# Patient Record
Sex: FEMALE | Race: WHITE | NOT HISPANIC OR LATINO | Employment: UNEMPLOYED | URBAN - METROPOLITAN AREA
[De-identification: names, ages, dates, MRNs, and addresses within clinical notes are randomized per-mention and may not be internally consistent; named-entity substitution may affect disease eponyms.]

---

## 2017-01-11 ENCOUNTER — OFFICE VISIT (OUTPATIENT)
Dept: URGENT CARE | Facility: URGENT CARE | Age: 27
End: 2017-01-11
Payer: COMMERCIAL

## 2017-01-11 VITALS
SYSTOLIC BLOOD PRESSURE: 108 MMHG | DIASTOLIC BLOOD PRESSURE: 60 MMHG | WEIGHT: 160 LBS | TEMPERATURE: 97.9 F | OXYGEN SATURATION: 100 % | HEART RATE: 49 BPM | BODY MASS INDEX: 23.7 KG/M2 | HEIGHT: 69 IN

## 2017-01-11 DIAGNOSIS — R07.0 THROAT PAIN: Primary | ICD-10-CM

## 2017-01-11 LAB
DEPRECATED S PYO AG THROAT QL EIA: NORMAL
MICRO REPORT STATUS: NORMAL
SPECIMEN SOURCE: NORMAL

## 2017-01-11 PROCEDURE — 99213 OFFICE O/P EST LOW 20 MIN: CPT | Performed by: FAMILY MEDICINE

## 2017-01-11 PROCEDURE — 87880 STREP A ASSAY W/OPTIC: CPT | Performed by: FAMILY MEDICINE

## 2017-01-11 PROCEDURE — 87081 CULTURE SCREEN ONLY: CPT | Performed by: FAMILY MEDICINE

## 2017-01-11 NOTE — NURSING NOTE
"Chief Complaint   Patient presents with     Pharyngitis     sore thoat, fatigue, no appetite 4x days       Initial /60 mmHg  Pulse 49  Temp(Src) 97.9  F (36.6  C) (Oral)  Ht 5' 9\" (1.753 m)  Wt 160 lb (72.576 kg)  BMI 23.62 kg/m2  SpO2 100% Estimated body mass index is 23.62 kg/(m^2) as calculated from the following:    Height as of this encounter: 5' 9\" (1.753 m).    Weight as of this encounter: 160 lb (72.576 kg).  BP completed using cuff size: regular    "

## 2017-01-11 NOTE — MR AVS SNAPSHOT
"              After Visit Summary   2017    Nicolle Devlin    MRN: [id number]           Patient Information     Date Of Birth          1990        Visit Information        Provider Department      2017 1:15 PM Vishnu Vasquez, DO [facility]        Today's Diagnoses     Throat pain    -  1        Follow-ups after your visit        Who to contact     If you have questions or need follow up information about today's clinic visit or your schedule please contact [place] [facility] directly at [phone].  Normal or non-critical lab and imaging results will be communicated to you by Lumos Labshart, letter or phone within 4 business days after the clinic has received the results. If you do not hear from us within 7 days, please contact the clinic through Lumos Labshart or phone. If you have a critical or abnormal lab result, we will notify you by phone as soon as possible.  Submit refill requests through [employer] or call your pharmacy and they will forward the refill request to us. Please allow 3 business days for your refill to be completed.          Additional Information About Your Visit        MyChart Information     [employer] lets you send messages to your doctor, view your test results, renew your prescriptions, schedule appointments and more. To sign up, go to [email or web address] . Click on \"Log in\" on the left side of the screen, which will take you to the Welcome page. Then click on \"Sign up Now\" on the right side of the page.     You will be asked to enter the access code listed below, as well as some personal information. Please follow the directions to create your username and password.     Your access code is: [email or web address]  Expires: 2017  1:50 PM     Your access code will  in 90 days. If you need help or a new code, please call your [place] clinic or [phone].        Care EveryWhere ID     This is your Care EveryWhere ID. This could be " "used by other organizations to access your [place] medical records  [id number]        Your Vitals Were     Pulse Temperature Height BMI (Body Mass Index) Pulse Oximetry       49 97.9  F (36.6  C) (Oral) 5' 9\" (1.753 m) 23.62 kg/m2 100%        Blood Pressure from Last 3 Encounters:   01/11/17 108/60   11/08/16 110/60   10/23/15 114/70    Weight from Last 3 Encounters:   01/11/17 160 lb (72.576 kg)   11/08/16 163 lb 9.6 oz (74.208 kg)   10/23/15 163 lb 12.8 oz (74.299 kg)              We Performed the Following     Beta strep group A culture     Rapid strep screen        Primary Care Provider    None Specified       No primary provider on file.        Thank you!     Thank you for choosing [facility]  for your care. Our goal is always to provide you with excellent care. Hearing back from our patients is one way we can continue to improve our services. Please take a few minutes to complete the written survey that you may receive in the mail after your visit with us. Thank you!             Your Updated Medication List - Protect others around you: Learn how to safely use, store and throw away your medicines at www.disposemymeds.org.          This list is accurate as of: 1/11/17  1:50 PM.  Always use your most recent med list.                   Brand Name Dispense Instructions for use    LORazepam 0.5 MG tablet    ATIVAN    15 tablet    Take 1 tablet (0.5 mg) by mouth every 8 hours as needed for anxiety       PARoxetine 20 MG tablet    PAXIL    90 tablet    Take 1 tablet (20 mg) by mouth daily         "

## 2017-01-11 NOTE — PROGRESS NOTES
"SUBJECTIVE: Nicolle Devlin is a 26 year old female presenting with a chief complaint of nasal congestion, cough  and sore throat.  Onset of symptoms was 4 day(s) ago.  Course of illness is same.    Severity moderate  Current and Associated symptoms: \"cold symptoms\"  Treatment measures tried include OTC meds.  Predisposing factors include None.    No past medical history on file.  No Known Allergies  Social History   Substance Use Topics     Smoking status: Never Smoker      Smokeless tobacco: Never Used     Alcohol Use: Yes      Comment: occasionally       ROS:  SKIN: no rash  GI: no vomiting    OBJECTIVE:  /60 mmHg  Pulse 49  Temp(Src) 97.9  F (36.6  C) (Oral)  Ht 5' 9\" (1.753 m)  Wt 160 lb (72.576 kg)  BMI 23.62 kg/m2  SpO2 100%GENERAL APPEARANCE: healthy, alert and no distress  EYES: EOMI,  PERRL, conjunctiva clear  HENT: ear canals and TM's normal.  Nose and mouth without ulcers, erythema or lesions  NECK: supple, nontender, no lymphadenopathy  RESP: lungs clear to auscultation - no rales, rhonchi or wheezes  SKIN: no suspicious lesions or rashes      ICD-10-CM    1. Throat pain R07.0 Rapid strep screen     Beta strep group A culture       Fluids/Rest, f/u if worse/not any better    "

## 2017-01-13 LAB
BACTERIA SPEC CULT: NORMAL
MICRO REPORT STATUS: NORMAL
SPECIMEN SOURCE: NORMAL

## 2017-06-27 ENCOUNTER — TRANSFERRED RECORDS (OUTPATIENT)
Dept: HEALTH INFORMATION MANAGEMENT | Facility: CLINIC | Age: 27
End: 2017-06-27

## 2017-06-27 LAB — PAP-ABSTRACT: NORMAL

## 2017-11-13 ENCOUNTER — OFFICE VISIT (OUTPATIENT)
Dept: FAMILY MEDICINE | Facility: CLINIC | Age: 27
End: 2017-11-13
Payer: COMMERCIAL

## 2017-11-13 VITALS
TEMPERATURE: 97.9 F | SYSTOLIC BLOOD PRESSURE: 122 MMHG | DIASTOLIC BLOOD PRESSURE: 78 MMHG | RESPIRATION RATE: 18 BRPM | HEART RATE: 56 BPM | BODY MASS INDEX: 23.78 KG/M2 | OXYGEN SATURATION: 100 % | WEIGHT: 161 LBS

## 2017-11-13 DIAGNOSIS — F43.23 ADJUSTMENT DISORDER WITH MIXED ANXIETY AND DEPRESSED MOOD: ICD-10-CM

## 2017-11-13 DIAGNOSIS — F41.0 PANIC ATTACK: ICD-10-CM

## 2017-11-13 PROCEDURE — 99213 OFFICE O/P EST LOW 20 MIN: CPT | Performed by: NURSE PRACTITIONER

## 2017-11-13 RX ORDER — PAROXETINE 20 MG/1
20 TABLET, FILM COATED ORAL DAILY
Qty: 90 TABLET | Refills: 3 | Status: SHIPPED | OUTPATIENT
Start: 2017-11-13 | End: 2018-11-08

## 2017-11-13 RX ORDER — LORAZEPAM 0.5 MG/1
0.5 TABLET ORAL EVERY 8 HOURS PRN
Qty: 15 TABLET | Refills: 0 | Status: SHIPPED | OUTPATIENT
Start: 2017-11-13 | End: 2018-11-30

## 2017-11-13 ASSESSMENT — ANXIETY QUESTIONNAIRES
GAD7 TOTAL SCORE: 1
7. FEELING AFRAID AS IF SOMETHING AWFUL MIGHT HAPPEN: NOT AT ALL
3. WORRYING TOO MUCH ABOUT DIFFERENT THINGS: NOT AT ALL
6. BECOMING EASILY ANNOYED OR IRRITABLE: NOT AT ALL
5. BEING SO RESTLESS THAT IT IS HARD TO SIT STILL: NOT AT ALL
2. NOT BEING ABLE TO STOP OR CONTROL WORRYING: NOT AT ALL
1. FEELING NERVOUS, ANXIOUS, OR ON EDGE: SEVERAL DAYS

## 2017-11-13 ASSESSMENT — PATIENT HEALTH QUESTIONNAIRE - PHQ9
SUM OF ALL RESPONSES TO PHQ QUESTIONS 1-9: 0
5. POOR APPETITE OR OVEREATING: NOT AT ALL

## 2017-11-13 NOTE — MR AVS SNAPSHOT
"              After Visit Summary   2017    Nicolle Devlin    MRN: [id number]           Patient Information     Date Of Birth          1990        Visit Information        Provider Department      2017 8:20 AM Robyn Gievns APRN CNP [facility]        Today's Diagnoses     Adjustment disorder with mixed anxiety and depressed mood        Panic attack           Follow-ups after your visit        Who to contact     If you have questions or need follow up information about today's clinic visit or your schedule please contact [facility] directly at [phone].  Normal or non-critical lab and imaging results will be communicated to you by Vizyhart, letter or phone within 4 business days after the clinic has received the results. If you do not hear from us within 7 days, please contact the clinic through Vizyhart or phone. If you have a critical or abnormal lab result, we will notify you by phone as soon as possible.  Submit refill requests through [employer] or call your pharmacy and they will forward the refill request to us. Please allow 3 business days for your refill to be completed.          Additional Information About Your Visit        MyChart Information     [employer] lets you send messages to your doctor, view your test results, renew your prescriptions, schedule appointments and more. To sign up, go to [email or web address] . Click on \"Log in\" on the left side of the screen, which will take you to the Welcome page. Then click on \"Sign up Now\" on the right side of the page.     You will be asked to enter the access code listed below, as well as some personal information. Please follow the directions to create your username and password.     Your access code is: [email or web address]  Expires: 2018  9:43 AM     Your access code will  in 90 days. If you need help or a new code, please call your [facility] or [phone].        Care EveryWhere " ID     This is your Care EveryWhere ID. This could be used by other organizations to access your [place] medical records  [id number]        Your Vitals Were     Pulse Temperature Respirations Pulse Oximetry BMI (Body Mass Index)       56 97.9  F (36.6  C) (Oral) 18 100% 23.78 kg/m2        Blood Pressure from Last 3 Encounters:   11/13/17 122/78   01/11/17 108/60   11/08/16 110/60    Weight from Last 3 Encounters:   11/13/17 161 lb (73 kg)   01/11/17 160 lb (72.6 kg)   11/08/16 163 lb 9.6 oz (74.2 kg)              Today, you had the following     No orders found for display         Where to get your medicines      These medications were sent to [facility] - [place], [place] - [address]  [address], [place] [address]     Phone:  [phone]     PARoxetine 20 MG tablet         Some of these will need a paper prescription and others can be bought over the counter.  Ask your nurse if you have questions.     Bring a paper prescription for each of these medications     LORazepam 0.5 MG tablet          Primary Care Provider Office Phone # Fax #    Robyn JIN Quiles [phone]       [address] [place] [address]        Equal Access to Services     KATERYNA ANDERSON AH: Lorie lopez hadasho Soomaali, waaxda luqadaha, qaybta kaalmada adeegyada, lewis motely haysantiago mccann . So [facility] [phone].    ATENCIÓN: Si habla español, tiene a erazo disposición servicios gratuitos de asistencia lingüística. Llame al [phone].    We comply with applicable federal civil rights laws and [place] laws. We do not discriminate on the basis of race, color, national origin, age, disability, sex, sexual orientation, or gender identity.            Thank you!     Thank you for choosing [facility]  for your care. Our goal is always to provide you with excellent care. Hearing back from our patients is one way we can continue to improve our services.  Please take a few minutes to complete the written survey that you may receive in the mail after your visit with us. Thank you!             Your Updated Medication List - Protect others around you: Learn how to safely use, store and throw away your medicines at www.disposemymeds.org.          This list is accurate as of: 11/13/17  9:43 AM.  Always use your most recent med list.                   Brand Name Dispense Instructions for use Diagnosis    LORazepam 0.5 MG tablet    ATIVAN    15 tablet    Take 1 tablet (0.5 mg) by mouth every 8 hours as needed for anxiety    Panic attack       PARoxetine 20 MG tablet    PAXIL    90 tablet    Take 1 tablet (20 mg) by mouth daily    Adjustment disorder with mixed anxiety and depressed mood

## 2017-11-13 NOTE — PROGRESS NOTES
SUBJECTIVE:   Nicolle Devlin is a 27 year old female who presents to clinic today for the following health issues:    Medication Followup of Paxil    Taking Medication as prescribed: yes    Side Effects:  None    Medication Helping Symptoms:  Yes    Needs refills    Also wondering about getting labs done today.       Reviewed and updated as needed this visit by clinical staffTobacco  Allergies  Meds  Problems  Med Hx  Surg Hx  Fam Hx  Soc Hx        Reviewed and updated as needed this visit by Provider  Tobacco  Allergies  Meds  Problems  Med Hx  Surg Hx  Fam Hx  Soc Hx            Nicolle Devlin is a 27 year old female presents today for follow up of depressive symptoms. Current symptoms include depressed mood. She was started on  Paxil/Paroxetine many  year(s) ago and is not having side effects as none.  Symptoms have been completely resolved.  Depression risk factors: previous episode of depression.    Has tried to go off in the past and went back on quickly due to symptoms.  Wants to continus on for now.  Not thinking about pregnancy anytime soon.      Current thoughts of suicide or homicide:No  Other treatment modalities employed include none.     The history section was last reviewed by Robyn Givens APRN CNP on Nov 13, 2017.    REVIEW OF SYSTEMS   ROS: 10 point ROS neg other than the symptoms noted above in the HPI.      OBJECTIVE:  /78 (BP Location: Left arm, Patient Position: Chair, Cuff Size: Adult Regular)  Pulse 56  Temp 97.9  F (36.6  C) (Oral)  Resp 18  Wt 161 lb (73 kg)  SpO2 100%  BMI 23.78 kg/m2    no apparent distress  Normal: Abstract reasoning  Attention and concentration  Coherency and relevance of thought  Dress, grooming, personal hygiene  Facial expression  Information  Judgment  Memory  Mood  Orientation  Perceptions  Posture and motor behavior  Speech  Thought content  Vocabulary  Abnormal:   PHQ-9 SCORE 11/8/2016 11/13/2017   Total Score 0 0     SIXTO-7  SCORE 11/8/2016 11/13/2017   Total Score 0 1           ASSESSMENT:  Chronic Depression (311) - in remission        PLAN:  Continue paxil daily and ativan for sparing use only  Reviewed concept of depression as function of biochemical imbalance of neurotransmitters/rationale for treatment.  Risks and benefits of medication(s) reviewed with patient.  Questions answered.  Counseling advised - if worsening.    Followup appointment in 1 year(s)  Patient instructed to call for significant side effects medications or problems  Patient advised immediate presentation to hospital for suicidal thought, etc.    No-harm verbal contract agreed upon    Robyn Givens RN, CNP

## 2017-11-14 ASSESSMENT — ANXIETY QUESTIONNAIRES: GAD7 TOTAL SCORE: 1

## 2018-01-07 ENCOUNTER — HEALTH MAINTENANCE LETTER (OUTPATIENT)
Age: 28
End: 2018-01-07

## 2018-06-12 ENCOUNTER — TRANSFERRED RECORDS (OUTPATIENT)
Dept: HEALTH INFORMATION MANAGEMENT | Facility: CLINIC | Age: 28
End: 2018-06-12

## 2018-07-12 ENCOUNTER — OFFICE VISIT (OUTPATIENT)
Dept: URGENT CARE | Facility: URGENT CARE | Age: 28
End: 2018-07-12
Payer: COMMERCIAL

## 2018-07-12 VITALS
SYSTOLIC BLOOD PRESSURE: 116 MMHG | TEMPERATURE: 98.2 F | WEIGHT: 161.3 LBS | RESPIRATION RATE: 16 BRPM | BODY MASS INDEX: 23.82 KG/M2 | DIASTOLIC BLOOD PRESSURE: 82 MMHG | HEART RATE: 63 BPM

## 2018-07-12 DIAGNOSIS — N92.1 MENOMETRORRHAGIA: Primary | ICD-10-CM

## 2018-07-12 LAB
ANION GAP SERPL CALCULATED.3IONS-SCNC: 5 MMOL/L (ref 3–14)
BASOPHILS # BLD AUTO: 0.1 10E9/L (ref 0–0.2)
BASOPHILS NFR BLD AUTO: 0.9 %
BUN SERPL-MCNC: 10 MG/DL (ref 7–30)
CALCIUM SERPL-MCNC: 8.6 MG/DL (ref 8.5–10.1)
CHLORIDE SERPL-SCNC: 106 MMOL/L (ref 94–109)
CO2 SERPL-SCNC: 29 MMOL/L (ref 20–32)
CREAT SERPL-MCNC: 0.79 MG/DL (ref 0.52–1.04)
DIFFERENTIAL METHOD BLD: NORMAL
EOSINOPHIL # BLD AUTO: 0.3 10E9/L (ref 0–0.7)
EOSINOPHIL NFR BLD AUTO: 5.3 %
ERYTHROCYTE [DISTWIDTH] IN BLOOD BY AUTOMATED COUNT: 12.7 % (ref 10–15)
GFR SERPL CREATININE-BSD FRML MDRD: 86 ML/MIN/1.7M2
GLUCOSE SERPL-MCNC: 95 MG/DL (ref 70–99)
HCT VFR BLD AUTO: 39.9 % (ref 35–47)
HGB BLD-MCNC: 12.6 G/DL (ref 11.7–15.7)
LYMPHOCYTES # BLD AUTO: 1.8 10E9/L (ref 0.8–5.3)
LYMPHOCYTES NFR BLD AUTO: 31.5 %
MCH RBC QN AUTO: 29.7 PG (ref 26.5–33)
MCHC RBC AUTO-ENTMCNC: 31.6 G/DL (ref 31.5–36.5)
MCV RBC AUTO: 94 FL (ref 78–100)
MONOCYTES # BLD AUTO: 0.5 10E9/L (ref 0–1.3)
MONOCYTES NFR BLD AUTO: 8.9 %
NEUTROPHILS # BLD AUTO: 3 10E9/L (ref 1.6–8.3)
NEUTROPHILS NFR BLD AUTO: 53.4 %
PLATELET # BLD AUTO: 195 10E9/L (ref 150–450)
POTASSIUM SERPL-SCNC: 4.6 MMOL/L (ref 3.4–5.3)
RBC # BLD AUTO: 4.24 10E12/L (ref 3.8–5.2)
SODIUM SERPL-SCNC: 140 MMOL/L (ref 133–144)
WBC # BLD AUTO: 5.6 10E9/L (ref 4–11)

## 2018-07-12 PROCEDURE — 99213 OFFICE O/P EST LOW 20 MIN: CPT | Performed by: PHYSICIAN ASSISTANT

## 2018-07-12 PROCEDURE — 36415 COLL VENOUS BLD VENIPUNCTURE: CPT | Performed by: PHYSICIAN ASSISTANT

## 2018-07-12 PROCEDURE — 85025 COMPLETE CBC W/AUTO DIFF WBC: CPT | Performed by: PHYSICIAN ASSISTANT

## 2018-07-12 PROCEDURE — 80048 BASIC METABOLIC PNL TOTAL CA: CPT | Performed by: PHYSICIAN ASSISTANT

## 2018-07-12 RX ORDER — NAPROXEN 500 MG/1
500 TABLET ORAL 2 TIMES DAILY WITH MEALS
Qty: 60 TABLET | Refills: 0 | Status: SHIPPED | OUTPATIENT
Start: 2018-07-12 | End: 2018-10-19

## 2018-07-12 RX ORDER — COPPER 313.4 MG/1
1 INTRAUTERINE DEVICE INTRAUTERINE ONCE
COMMUNITY

## 2018-07-12 NOTE — LETTER
[place] [facility] [place]  [address] [address]  [phone]      July 12, 2018    RE:  Nicolle Devlin                                                                                                                                                       [address]  [place] [address]            To whom it may concern:    Nicolle Devlin was seen in clinic today for illness.    She may return to work on her next scheduled work shift after tomorrow.            Sincerely,        Jolie Buck [facility]

## 2018-07-12 NOTE — MR AVS SNAPSHOT
After Visit Summary   7/12/2018    Nicolle Devlin    MRN: [id number]           Patient Information     Date Of Birth          1990        Visit Information        Provider Department      7/12/2018 7:20 PM Jolie Pinzon PA-C [facility]        Today's Diagnoses     Menometrorrhagia    -  1      Care Instructions    (N92.1) Menometrorrhagia  (primary encounter diagnosis)  Comment:   Plan: CBC with platelets and differential, Basic         metabolic panel  (Ca, Cl, CO2, Creat, Gluc, K,         Na, BUN)          Naproxen for next menstrual cycle    Keep follow up with your primary clinic tomorrow.      Increase fluids.                  Follow-ups after your visit        Your next 10 appointments already scheduled     Jul 13, 2018  7:40 AM CDT   Adarsh Grace with JIN Gordon CNP   [facility]    [address] [place] [address] [phone]              Who to contact     If you have questions or need follow up information about today's clinic visit or your schedule please contact [facility] directly at [phone].  Normal or non-critical lab and imaging results will be communicated to you by MyChart, letter or phone within 4 business days after the clinic has received the results. If you do not hear from us within 7 days, please contact the clinic through [employer]store[employer] or phone. If you have a critical or abnormal lab result, we will notify you by phone as soon as possible.  Submit refill requests through [employer] or call your pharmacy and they will forward the refill request to us. Please allow 3 business days for your refill to be completed.          Additional Information About Your Visit        MyChart Information     [employer] gives you secure access to your electronic health record. If you see a primary care provider, you can also send messages to your  care team and make appointments. If you have questions, please call your primary care clinic.  If you do not have a primary care provider, please call [phone] and they will assist you.        Care EveryWhere ID     This is your Care EveryWhere ID. This could be used by other organizations to access your [place] medical records  [id number]        Your Vitals Were     Pulse Temperature Respirations BMI (Body Mass Index)          63 98.2  F (36.8  C) (Oral) 16 23.82 kg/m2         Blood Pressure from Last 3 Encounters:   07/12/18 116/82   11/13/17 122/78   01/11/17 108/60    Weight from Last 3 Encounters:   07/12/18 161 lb 4.8 oz (73.2 kg)   11/13/17 161 lb (73 kg)   01/11/17 160 lb (72.6 kg)              We Performed the Following     Basic metabolic panel  (Ca, Cl, CO2, Creat, Gluc, K, Na, BUN)     CBC with platelets and differential          Today's Medication Changes          These changes are accurate as of 7/12/18  8:52 PM.  If you have any questions, ask your nurse or doctor.               Start taking these medicines.        Dose/Directions    naproxen 500 MG tablet   Commonly known as:  NAPROSYN   Used for:  Menometrorrhagia   Started by:  Jolie Pinzon PA-C        Dose:  500 mg   Take 1 tablet (500 mg) by mouth 2 times daily (with meals)   Quantity:  60 tablet   Refills:  0            Where to get your medicines      Some of these will need a paper prescription and others can be bought over the counter.  Ask your nurse if you have questions.     Bring a paper prescription for each of these medications     naproxen 500 MG tablet                Primary Care Provider Office Phone # Fax #    Robyn JIN Quiles [facility] [phone] [address] [facility] [address]        Equal Access to Services     KATERYNA ANDERSON AH: Lorie Jay, wabolivar ciufentes, qaybta kaalmaramiro perez, lewis sibley. So [facility] [phone].    ATENCIÓN:  Si habla jacklyn, tiene a erazo disposición servicios gratuitos de asistencia lingüística. Melvin zamora [phone].    We comply with applicable federal civil rights laws and [place] laws. We do not discriminate on the basis of race, color, national origin, age, disability, sex, sexual orientation, or gender identity.            Thank you!     Thank you for choosing [facility] [place]  for your care. Our goal is always to provide you with excellent care. Hearing back from our patients is one way we can continue to improve our services. Please take a few minutes to complete the written survey that you may receive in the mail after your visit with us. Thank you!             Your Updated Medication List - Protect others around you: Learn how to safely use, store and throw away your medicines at www.disposemymeds.org.          This list is accurate as of 7/12/18  8:52 PM.  Always use your most recent med list.                   Brand Name Dispense Instructions for use Diagnosis    LORazepam 0.5 MG tablet    ATIVAN    15 tablet    Take 1 tablet (0.5 mg) by mouth every 8 hours as needed for anxiety    Panic attack       naproxen 500 MG tablet    NAPROSYN    60 tablet    Take 1 tablet (500 mg) by mouth 2 times daily (with meals)    Menometrorrhagia       paragard intrauterine copper      1 each by Intrauterine route once        PARoxetine 20 MG tablet    PAXIL    90 tablet    Take 1 tablet (20 mg) by mouth daily    Adjustment disorder with mixed anxiety and depressed mood

## 2018-07-13 ENCOUNTER — OFFICE VISIT (OUTPATIENT)
Dept: FAMILY MEDICINE | Facility: CLINIC | Age: 28
End: 2018-07-13
Payer: COMMERCIAL

## 2018-07-13 VITALS
BODY MASS INDEX: 22.68 KG/M2 | RESPIRATION RATE: 16 BRPM | HEART RATE: 50 BPM | DIASTOLIC BLOOD PRESSURE: 78 MMHG | TEMPERATURE: 98 F | OXYGEN SATURATION: 100 % | WEIGHT: 158.4 LBS | HEIGHT: 70 IN | SYSTOLIC BLOOD PRESSURE: 116 MMHG

## 2018-07-13 DIAGNOSIS — Z30.011 ENCOUNTER FOR INITIAL PRESCRIPTION OF CONTRACEPTIVE PILLS: Primary | ICD-10-CM

## 2018-07-13 PROCEDURE — 99213 OFFICE O/P EST LOW 20 MIN: CPT | Performed by: NURSE PRACTITIONER

## 2018-07-13 RX ORDER — DESOGESTREL AND ETHINYL ESTRADIOL 0.15-0.03
1 KIT ORAL DAILY
Qty: 84 TABLET | Refills: 3 | Status: SHIPPED | OUTPATIENT
Start: 2018-07-13 | End: 2018-11-30

## 2018-07-13 ASSESSMENT — ANXIETY QUESTIONNAIRES
7. FEELING AFRAID AS IF SOMETHING AWFUL MIGHT HAPPEN: NOT AT ALL
5. BEING SO RESTLESS THAT IT IS HARD TO SIT STILL: NOT AT ALL
GAD7 TOTAL SCORE: 1
3. WORRYING TOO MUCH ABOUT DIFFERENT THINGS: NOT AT ALL
1. FEELING NERVOUS, ANXIOUS, OR ON EDGE: SEVERAL DAYS
6. BECOMING EASILY ANNOYED OR IRRITABLE: NOT AT ALL
2. NOT BEING ABLE TO STOP OR CONTROL WORRYING: NOT AT ALL

## 2018-07-13 ASSESSMENT — PATIENT HEALTH QUESTIONNAIRE - PHQ9: 5. POOR APPETITE OR OVEREATING: NOT AT ALL

## 2018-07-13 NOTE — MR AVS SNAPSHOT
"              After Visit Summary   7/13/2018    Nicolle Devlin    MRN: [id number]           Patient Information     Date Of Birth          1990        Visit Information        Provider Department      7/13/2018 7:40 AM Robyn Givens APRN CNP [facility]        Today's Diagnoses     Encounter for initial prescription of contraceptive pills    -  1       Follow-ups after your visit        Who to contact     If you have questions or need follow up information about today's clinic visit or your schedule please contact [facility] directly at [phone].  Normal or non-critical lab and imaging results will be communicated to you by BlueSprighart, letter or phone within 4 business days after the clinic has received the results. If you do not hear from us within 7 days, please contact the clinic through BlueSprighart or phone. If you have a critical or abnormal lab result, we will notify you by phone as soon as possible.  Submit refill requests through "[employer] or call your pharmacy and they will forward the refill request to us. Please allow 3 business days for your refill to be completed.          Additional Information About Your Visit        MyChart Information     "[employer] gives you secure access to your electronic health record. If you see a primary care provider, you can also send messages to your care team and make appointments. If you have questions, please call your primary care clinic.  If you do not have a primary care provider, please call [phone] and they will assist you.        Care EveryWhere ID     This is your Care EveryWhere ID. This could be used by other organizations to access your [place] medical records  [id number]        Your Vitals Were     Pulse Temperature Respirations Height Last Period Pulse Oximetry    50 98  F (36.7  C) (Oral) 16 5' 9.5\" (1.765 m) 07/10/2018 (Approximate) 100%    BMI (Body Mass Index)                   23.06 kg/m2            " Blood Pressure from Last 3 Encounters:   07/13/18 116/78   07/12/18 116/82   11/13/17 122/78    Weight from Last 3 Encounters:   07/13/18 158 lb 6.4 oz (71.8 kg)   07/12/18 161 lb 4.8 oz (73.2 kg)   11/13/17 161 lb (73 kg)              Today, you had the following     No orders found for display         Today's Medication Changes          These changes are accurate as of 7/13/18  8:26 AM.  If you have any questions, ask your nurse or doctor.               Start taking these medicines.        Dose/Directions    desogestrel-ethinyl estradiol 0.15-30 MG-MCG per tablet   Commonly known as:  APRI   Used for:  Encounter for initial prescription of contraceptive pills   Started by:  Robyn Givens APRN CNP        Dose:  1 tablet   Take 1 tablet by mouth daily   Quantity:  84 tablet   Refills:  3            Where to get your medicines      These medications were sent to [facility] - [place], [place] - [address]  [address], [place] [address]     Phone:  [phone]     desogestrel-ethinyl estradiol 0.15-30 MG-MCG per tablet                Primary Care Provider Office Phone # Fax #    JIN Gordon [phone]       [address] [place] [address]        Equal Access to Services     KATERYNA ANDERSON AH: Hadii slick lopez hadasho Soomaali, waaxda luqadaha, qaybta kaalmada mimayaramiro, lewis sibley. So [facility] [phone].    ATENCIÓN: Si habla español, tiene a erazo disposición servicios gratuitos de asistencia lingüística. Melvin al [phone].    We comply with applicable federal civil rights laws and [place] laws. We do not discriminate on the basis of race, color, national origin, age, disability, sex, sexual orientation, or gender identity.            Thank you!     Thank you for choosing [facility]  for your care. Our goal is always to provide you with excellent care. Hearing back from our patients is one way we  can continue to improve our services. Please take a few minutes to complete the written survey that you may receive in the mail after your visit with us. Thank you!             Your Updated Medication List - Protect others around you: Learn how to safely use, store and throw away your medicines at www.disposemymeds.org.          This list is accurate as of 7/13/18  8:26 AM.  Always use your most recent med list.                   Brand Name Dispense Instructions for use Diagnosis    desogestrel-ethinyl estradiol 0.15-30 MG-MCG per tablet    APRI    84 tablet    Take 1 tablet by mouth daily    Encounter for initial prescription of contraceptive pills       LORazepam 0.5 MG tablet    ATIVAN    15 tablet    Take 1 tablet (0.5 mg) by mouth every 8 hours as needed for anxiety    Panic attack       naproxen 500 MG tablet    NAPROSYN    60 tablet    Take 1 tablet (500 mg) by mouth 2 times daily (with meals)    Menometrorrhagia       paragard intrauterine copper      1 each by Intrauterine route once        PARoxetine 20 MG tablet    PAXIL    90 tablet    Take 1 tablet (20 mg) by mouth daily    Adjustment disorder with mixed anxiety and depressed mood

## 2018-07-13 NOTE — Clinical Note
Please abstract the following data from this visit with this patient into the appropriate field in Epic:  Pap smear done on this date: sep 2017 (approximately), by this group: [facility] , results were normal.

## 2018-07-13 NOTE — PATIENT INSTRUCTIONS
(N92.1) Menometrorrhagia  (primary encounter diagnosis)  Comment:   Plan: CBC with platelets and differential, Basic         metabolic panel  (Ca, Cl, CO2, Creat, Gluc, K,         Na, BUN)          Naproxen for next menstrual cycle    Keep follow up with your primary clinic tomorrow.      Increase fluids.

## 2018-07-13 NOTE — PROGRESS NOTES
"  SUBJECTIVE:   Nicolle Devlin is a 27 year old female who presents to clinic today for the following health issues:      Pt states that she has been having symptoms during her period, she feels like she is going to pass out,   pt would like to change her birth control.    Went to  last night thought her Hgb was low due to heavy periods.    Wears super plus pads for 2 days (sometime changing hourly) since she has had the paraguard IUD in.    Was Feeling dizzy and lightheaded each period for the last several months.         Problem list and histories reviewed & adjusted, as indicated.  Additional history: as documented    Reviewed and updated as needed this visit by clinical staff  Tobacco  Allergies  Meds  Problems  Med Hx  Surg Hx  Fam Hx  Soc Hx        Reviewed and updated as needed this visit by Provider  Tobacco  Allergies  Meds  Problems  Med Hx  Surg Hx  Fam Hx  Soc Hx          ROS:  Constitutional, HEENT, cardiovascular, pulmonary, gi and gu systems are negative, except as otherwise noted.    OBJECTIVE:     /78  Pulse 50  Temp 98  F (36.7  C) (Oral)  Resp 16  Ht 5' 9.5\" (1.765 m)  Wt 158 lb 6.4 oz (71.8 kg)  LMP 07/10/2018 (Approximate)  SpO2 100%  BMI 23.06 kg/m2  Body mass index is 23.06 kg/(m^2).  GENERAL: healthy, alert and no distress  EYES: Eyes grossly normal to inspection, PERRL and conjunctivae and sclerae normal  RESP: lungs clear to auscultation - no rales, rhonchi or wheezes  CV: regular rate and rhythm, normal S1 S2, no S3 or S4, no murmur, click or rub, no peripheral edema and peripheral pulses strong  MS: no gross musculoskeletal defects noted, no edema  SKIN: pink warm and dry.  no suspicious lesions or rashes      ASSESSMENT/PLAN:       ICD-10-CM    1. Encounter for initial prescription of contraceptive pills Z30.011 desogestrel-ethinyl estradiol (APRI) 0.15-30 MG-MCG per tablet     Discussed options of removing IUD, starting OCP or a combination.    Outside of " the heavy periods, she likes the IUD.    Prior to IUD had taken OCP' for years.    Would like to start OCP to see if this helps the periods.    Will recheck in 2-3 months and if still problematic may consider removing IUD.    Pt not interested in removing IUD today.      The use of the oral contraceptive has been fully discussed with the patient.   The patient has been told of the more serious potential side effects such as MI, stroke, and deep vein thrombosis, all of which are very unlikely.  She has been asked to report any signs of such serious problems immediately.  She should back up the pill with a condom during the first cycle.  She has been given written literature regarding use and side effects of oral contraceptives. The need for additional protection, such as a condom, to prevent exposure to sexually transmitted diseases has also been discussed- the patient has been clearly reminded that OCP's cannot protect her against diseases such as HIV and others. She understands and wishes to take the medication as prescribed.      JIN Gordon CNP  [facility]

## 2018-07-13 NOTE — PROGRESS NOTES
SUBJECTIVE:  Nicolle Devlin is a 27 year old female who presents with feeling weak since this morning.  Worked over night and slept during the day.    Still feels weak.      Taking ibuprofen and tylenol to prevent menstrual cramping.    Denies any abdominal pain.      No fevers.      She states that she has been having heavy cycles for the past 6 months.    Today she is changing her tampon every hour since awakening.          Vaginal bleeding: menses        Vaginal symptoms: no other symptoms.      No LMP recorded.  LMP 7/9/18.    Heavy cycles.   Has a paraguard IUD, has had it for 5 years.    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    She is here with her significant other.          No past medical history on file.  Current Outpatient Prescriptions   Medication Sig Dispense Refill     LORazepam (ATIVAN) 0.5 MG tablet Take 1 tablet (0.5 mg) by mouth every 8 hours as needed for anxiety 15 tablet 0     paragard intrauterine copper 1 each by Intrauterine route once       PARoxetine (PAXIL) 20 MG tablet Take 1 tablet (20 mg) by mouth daily 90 tablet 3     Social History     Social History     Marital status: Single     Spouse name: N/A     Number of children: N/A     Years of education: N/A     Occupational History     Not on file.     Social History Main Topics     Smoking status: Never Smoker     Smokeless tobacco: Never Used     Alcohol use Yes      Comment: occasionally     Drug use: No     Sexual activity: Yes     Partners: Male     Birth control/ protection: IUD     Other Topics Concern     Parent/Sibling W/ Cabg, Mi Or Angioplasty Before 65f 55m? No     Social History Narrative       ROS:   CONSTITUTIONAL:NEGATIVE for fever, chills, change in weight  INTEGUMENTARY/SKIN: NEGATIVE for worrisome rashes, moles or lesions  EYES: NEGATIVE for vision changes or irritation  ENT/MOUTH: NEGATIVE for ear, mouth and throat problems  RESP:NEGATIVE for significant cough or SOB  : as per HPI  MUSCULOSKELETAL: NEGATIVE for significant  arthralgias or myalgia  NEURO: as per HPI  Review of systems negative except as stated above.    OBJECTIVE:  /82  Pulse 63  Temp 98.2  F (36.8  C) (Oral)  Resp 16  Wt 161 lb 4.8 oz (73.2 kg)  BMI 23.82 kg/m2  : deferred  GENERAL APPEARANCE: healthy, alert and no distress  OP: moist mucous membranes without erythema  EYES: conjunctivae clear.  NO palpebral pallor appreciated.    CV: regular rates and rhythm, normal S1 S2, no murmur noted  ABDOMEN:  soft, nontender, no HSM or masses and bowel sounds normal  BACK: No CVA tenderness  SKIN: no suspicious lesions or rashes.  Skin turgor normal.    NEURO: grossly intact.         (N92.1) Menometrorrhagia  (primary encounter diagnosis)  Comment:   Plan: CBC with platelets and differential, Basic         metabolic panel  (Ca, Cl, CO2, Creat, Gluc, K,         Na, BUN)          Naproxen for next menstrual cycle    Keep follow up with your primary clinic tomorrow.      Increase fluids.    Patient expresses understanding and agreement with the assessment and plan as above.

## 2018-07-14 ASSESSMENT — ANXIETY QUESTIONNAIRES: GAD7 TOTAL SCORE: 1

## 2018-07-14 ASSESSMENT — PATIENT HEALTH QUESTIONNAIRE - PHQ9: SUM OF ALL RESPONSES TO PHQ QUESTIONS 1-9: 0

## 2018-10-19 ENCOUNTER — OFFICE VISIT (OUTPATIENT)
Dept: DERMATOLOGY | Facility: CLINIC | Age: 28
End: 2018-10-19
Payer: COMMERCIAL

## 2018-10-19 VITALS — OXYGEN SATURATION: 100 % | DIASTOLIC BLOOD PRESSURE: 75 MMHG | SYSTOLIC BLOOD PRESSURE: 116 MMHG | HEART RATE: 83 BPM

## 2018-10-19 DIAGNOSIS — L70.0 ACNE VULGARIS: Primary | ICD-10-CM

## 2018-10-19 PROCEDURE — 99203 OFFICE O/P NEW LOW 30 MIN: CPT | Performed by: PHYSICIAN ASSISTANT

## 2018-10-19 RX ORDER — DOXYCYCLINE 100 MG/1
CAPSULE ORAL
Qty: 60 CAPSULE | Refills: 2 | Status: SHIPPED | OUTPATIENT
Start: 2018-10-19 | End: 2019-10-02

## 2018-10-19 RX ORDER — SPIRONOLACTONE 100 MG/1
TABLET, FILM COATED ORAL
Qty: 90 TABLET | Refills: 1 | Status: SHIPPED | OUTPATIENT
Start: 2018-10-19 | End: 2019-10-02

## 2018-10-19 NOTE — PATIENT INSTRUCTIONS
Acne Instructions:    1. Continue washing face with Cetaphil   2. Apply a pea sized amount of Tazorac to entire face every day   3. Can mix Tazorac 1:1 with moisturizer with NO SPF    Start doxycycline 100 mg twice daily for 3 months. Advised to take with food and full glass of water.     Start spironolactone 100 mg daily- may take in the morning or night     Follow up 2 months

## 2018-10-19 NOTE — LETTER
10/19/2018         RE: Nicolle Devlin  [address] [facility] [address]  [place] [address]        Dear Colleague,    Thank you for referring your patient, Nicolle Devlin, to the [facility]. Please see a copy of my visit note below.    HPI:   Nicolle Devlin is a 27 year old female who presents for acne.  chief complaint  Condition has been present for: many years  Pt complains of pain: Yes  Menses: Normal   Flares with menses: Yes. She has the paraguard currently.     Previous treatments include: Cetaphil and Tazorac in the past with some improvement     Areas Involved: face    Social: she is getting  in May    Current Outpatient Prescriptions   Medication Sig Dispense Refill     LORazepam (ATIVAN) 0.5 MG tablet Take 1 tablet (0.5 mg) by mouth every 8 hours as needed for anxiety 15 tablet 0     paragard intrauterine copper 1 each by Intrauterine route once       PARoxetine (PAXIL) 20 MG tablet Take 1 tablet (20 mg) by mouth daily 90 tablet 3     desogestrel-ethinyl estradiol (APRI) 0.15-30 MG-MCG per tablet Take 1 tablet by mouth daily (Patient not taking: Reported on 10/19/2018) 84 tablet 3     No Known Allergies  Denies any other skin complaints, in general feels well: Yes  Review of symptoms otherwise negative:Yes    This document serves as a record of the services and decisions personally performed and made by Gina Araiza, MS, PA-C. It was created on her behalf by Char Cervantes, a trained medical scribe. The creation of this document is based on the provider's statements to the medical scribe.  Char Cervantes 9:16 AM October 19, 2018    PHYSICAL EXAM:   A&Ox3: Yes   Well developed/well nourished female Yes   Mood appropriate Yes      /75  Pulse 83  SpO2 100%  Type 2 skin. Mood appropriate  Alert and Oriented X 3. Well developed, well nourished in no distress.  General appearance: Normal  Head including face: Normal  Eyes: conjunctiva and  lids: Normal  Mouth: Lips, teeth, gums: Normal  Neck: Normal  Back: clear  Cardiovascular: Exam of peripheral vascular system by observation for swelling, varicosities, edema: Normal  Extremities: digits/nails (clubbing): Normal  Right upper extremity: Normal  Left upper extremity: Normal  Right lower extremity: Normal  Left lower extremity: Normal  Skin: Scalp and body hair: See below     Comedones Papules/Pustules Cysts Staining Scarring   Face/Neck 2+ 2+ 0 0 0   Chest 0 0 0 0 0   Back 0 0 0 0 0     Telangiectasias: No Fixed Erythema: No Exoriations: No   Other Physical Exam Findings:    ASSESSMENT & PLAN:   1. Acne Vulgaris - advised on diagnosis and treatment options. Discussed use of topical medications and antibiotics. She is using IUD ParaGard. Getting  in May and would like to be clear for her wedding.   --Start spironolactone 100 mg daily. Advised on potential for hypotension, teratogenetic effects, menstrual irregularities, hyperkalemia and need for Q 6 month K+ checks.   --Start doxycycline 100 mg BID x 3 months. Advised to take with food. Discussed risk of GI upset, esophagitis and photosensitivity.   --Start Tazorac QD        Pt advised on use and risks including photosensitivity, allergic reactions, GI upset, headaches, nausea, erythema, scaling, vertigo, asthralgias, blood clots:Yes    Follow-up: 2 months   CC:   Scribed By: Char Cervantes, Medical Scribe    The information in this document, created by the medical scribe for me, accurately reflects the services I personally performed and the decisions made by me. I have reviewed and approved this document for accuracy prior to leaving the patient care area.  October 19, 2018 9:26 AM    Gina Araiza MS, PAESTRELLITA        Again, thank you for allowing me to participate in the care of your patient.        Sincerely,        Gina Araiza PA-C

## 2018-10-19 NOTE — PROGRESS NOTES
HPI:   Nciolle Devlin is a 27 year old female who presents for acne.  chief complaint  Condition has been present for: many years  Pt complains of pain: Yes  Menses: Normal   Flares with menses: Yes. She has the paraguard currently.     Previous treatments include: Cetaphil and Tazorac in the past with some improvement     Areas Involved: face    Social: she is getting  in May    Current Outpatient Prescriptions   Medication Sig Dispense Refill     LORazepam (ATIVAN) 0.5 MG tablet Take 1 tablet (0.5 mg) by mouth every 8 hours as needed for anxiety 15 tablet 0     paragard intrauterine copper 1 each by Intrauterine route once       PARoxetine (PAXIL) 20 MG tablet Take 1 tablet (20 mg) by mouth daily 90 tablet 3     desogestrel-ethinyl estradiol (APRI) 0.15-30 MG-MCG per tablet Take 1 tablet by mouth daily (Patient not taking: Reported on 10/19/2018) 84 tablet 3     No Known Allergies  Denies any other skin complaints, in general feels well: Yes  Review of symptoms otherwise negative:Yes    This document serves as a record of the services and decisions personally performed and made by Gina Araiza, MS, PA-C. It was created on her behalf by Char Cervantes, a trained medical scribe. The creation of this document is based on the provider's statements to the medical scribe.  Char Cervantes 9:16 AM October 19, 2018    PHYSICAL EXAM:   A&Ox3: Yes   Well developed/well nourished female Yes   Mood appropriate Yes      /75  Pulse 83  SpO2 100%  Type 2 skin. Mood appropriate  Alert and Oriented X 3. Well developed, well nourished in no distress.  General appearance: Normal  Head including face: Normal  Eyes: conjunctiva and lids: Normal  Mouth: Lips, teeth, gums: Normal  Neck: Normal  Back: clear  Cardiovascular: Exam of peripheral vascular system by observation for swelling, varicosities, edema: Normal  Extremities: digits/nails (clubbing): Normal  Right upper extremity: Normal  Left  upper extremity: Normal  Right lower extremity: Normal  Left lower extremity: Normal  Skin: Scalp and body hair: See below     Comedones Papules/Pustules Cysts Staining Scarring   Face/Neck 2+ 2+ 0 0 0   Chest 0 0 0 0 0   Back 0 0 0 0 0     Telangiectasias: No Fixed Erythema: No Exoriations: No   Other Physical Exam Findings:    ASSESSMENT & PLAN:   1. Acne Vulgaris - advised on diagnosis and treatment options. Discussed use of topical medications and antibiotics. She is using IUD ParaGard. Getting  in May and would like to be clear for her wedding.   --Start spironolactone 100 mg daily. Advised on potential for hypotension, teratogenetic effects, menstrual irregularities, hyperkalemia and need for Q 6 month K+ checks.   --Start doxycycline 100 mg BID x 3 months. Advised to take with food. Discussed risk of GI upset, esophagitis and photosensitivity.   --Start Tazorac QD        Pt advised on use and risks including photosensitivity, allergic reactions, GI upset, headaches, nausea, erythema, scaling, vertigo, asthralgias, blood clots:Yes    Follow-up: 2 months   CC:   Scribed By: Char Cervantes, Medical Scribe    The information in this document, created by the medical scribe for me, accurately reflects the services I personally performed and the decisions made by me. I have reviewed and approved this document for accuracy prior to leaving the patient care area.  October 19, 2018 9:26 AM    Gina Araiza MS, PA-C

## 2018-11-07 ENCOUNTER — MYC MEDICAL ADVICE (OUTPATIENT)
Dept: FAMILY MEDICINE | Facility: CLINIC | Age: 28
End: 2018-11-07

## 2018-11-07 DIAGNOSIS — F43.23 ADJUSTMENT DISORDER WITH MIXED ANXIETY AND DEPRESSED MOOD: ICD-10-CM

## 2018-11-08 RX ORDER — PAROXETINE 20 MG/1
20 TABLET, FILM COATED ORAL DAILY
Qty: 30 TABLET | Refills: 0 | Status: SHIPPED | OUTPATIENT
Start: 2018-11-08 | End: 2018-11-30

## 2018-11-08 NOTE — TELEPHONE ENCOUNTER
Medication is being filled for 1 time refill only due to:  Patient needs to be seen because will [place] yearly [place]..   July Richard RN

## 2018-11-30 ENCOUNTER — OFFICE VISIT (OUTPATIENT)
Dept: FAMILY MEDICINE | Facility: CLINIC | Age: 28
End: 2018-11-30
Payer: COMMERCIAL

## 2018-11-30 VITALS
BODY MASS INDEX: 23.14 KG/M2 | WEIGHT: 159 LBS | HEART RATE: 63 BPM | TEMPERATURE: 97.6 F | DIASTOLIC BLOOD PRESSURE: 74 MMHG | SYSTOLIC BLOOD PRESSURE: 115 MMHG | RESPIRATION RATE: 16 BRPM

## 2018-11-30 DIAGNOSIS — F41.0 PANIC ATTACK: ICD-10-CM

## 2018-11-30 DIAGNOSIS — F43.23 ADJUSTMENT DISORDER WITH MIXED ANXIETY AND DEPRESSED MOOD: ICD-10-CM

## 2018-11-30 PROCEDURE — 99213 OFFICE O/P EST LOW 20 MIN: CPT | Performed by: NURSE PRACTITIONER

## 2018-11-30 RX ORDER — PAROXETINE 20 MG/1
20 TABLET, FILM COATED ORAL DAILY
Qty: 90 TABLET | Refills: 3 | Status: SHIPPED | OUTPATIENT
Start: 2018-11-30 | End: 2019-07-14

## 2018-11-30 RX ORDER — LORAZEPAM 0.5 MG/1
0.5 TABLET ORAL EVERY 8 HOURS PRN
Qty: 15 TABLET | Refills: 0 | Status: SHIPPED | OUTPATIENT
Start: 2018-11-30 | End: 2019-03-06

## 2018-11-30 ASSESSMENT — PATIENT HEALTH QUESTIONNAIRE - PHQ9
SUM OF ALL RESPONSES TO PHQ QUESTIONS 1-9: 0
5. POOR APPETITE OR OVEREATING: NOT AT ALL

## 2018-11-30 ASSESSMENT — ANXIETY QUESTIONNAIRES
5. BEING SO RESTLESS THAT IT IS HARD TO SIT STILL: NOT AT ALL
1. FEELING NERVOUS, ANXIOUS, OR ON EDGE: NOT AT ALL
7. FEELING AFRAID AS IF SOMETHING AWFUL MIGHT HAPPEN: NOT AT ALL
2. NOT BEING ABLE TO STOP OR CONTROL WORRYING: NOT AT ALL
GAD7 TOTAL SCORE: 0
IF YOU CHECKED OFF ANY PROBLEMS ON THIS QUESTIONNAIRE, HOW DIFFICULT HAVE THESE PROBLEMS MADE IT FOR YOU TO DO YOUR WORK, TAKE CARE OF THINGS AT HOME, OR GET ALONG WITH OTHER PEOPLE: NOT DIFFICULT AT ALL
3. WORRYING TOO MUCH ABOUT DIFFERENT THINGS: NOT AT ALL
6. BECOMING EASILY ANNOYED OR IRRITABLE: NOT AT ALL

## 2018-11-30 NOTE — MR AVS SNAPSHOT
After Visit Summary   11/30/2018    Nicolle Devlin    MRN: [id number]           Patient Information     Date Of Birth          1990        Visit Information        Provider Department      11/30/2018 11:00 AM Robyn Givens APRN [facility]        Today's Diagnoses     Panic attack        Adjustment disorder with mixed anxiety and depressed mood          Care Instructions                 Major Depression  What is major depression?   Depression is a condition in which you feel sad, hopeless, and uninterested in daily life. Major depression is severe depression that lasts for at least 2 full weeks.   How does it occur?   Major depression may start after some event or it may not be caused by anything specific. You may have major depression after a period of having dysthymia. Dysthymia is being mildly depressed almost every day for 2 or more years. If major depression develops from dysthymia, you are more likely to have major depression in the future.   People are more likely to develop depression if they:   have family members who have had depression, bipolar disorder, or anxiety problems   are female. Women are twice as likely as men to have major depression   have a major medical problem such as heart disease or cancer   The chemicals in your nervous system and the way that brain cells communicate changes with major depression. Exactly how this works and what it means are not fully understood.   Major depression may start at any age. Teenagers and young adults, as well as older adults, are more likely to have this condition than middle-aged adults.   What are the symptoms?   Besides feeling very sad and uninterested in things you usually enjoy, you may also:   be irritable   have trouble falling asleep, wake up very early, or sleep too much   feel more anxiety or panic   notice changes in your appetite and weight, either up or down   notice changes in your energy  level, usually down but sometimes feeling overexcited   lose sexual desire and function   feel worthless and guilty   have trouble concentrating or remembering things   feel hopeless or just not care about anything   have unexplained physical symptoms   think often about death or suicide   Other symptoms may vary with age. If you are a teenager, you may be irritable, get angry, abuse substances, and cause trouble with parents and at school. If you are a young or middle-aged adult, you may abuse substances such as drugs or alcohol, have physical problems (like pain or stomach upsets), or feel nervous.   Depressed older people are more likely to complain of physical problems than that they are feeling sad, anxious, or hopeless. Tiredness, mood changes, sleepiness, and memory problems may be side effects of medicines rather than symptoms of depression. Other medical conditions, such as diabetes, heart disease, and Alzheimer's disease, can also cause similar symptoms.   How is it diagnosed?   Your healthcare provider or a mental health professional will ask about your symptoms and any drug or alcohol use. You may have lab tests to rule out medical problems such as hormone imbalances. There are no lab tests that directly diagnose depression.   How is it treated?   Do not try to overcome clinical depression by yourself. It can usually be successfully treated with psychotherapy, antidepressant medicine, or both. Discuss this with your healthcare provider or therapist.   Medicine  Several types of prescription medicines can help treat major depression. Your healthcare provider will work with you to carefully select the right medicine for you.   You must take these medicines daily for 3 to 6 weeks to get full benefit from them. Most people benefit from taking these medicines for at least 6 months.   No nonprescription medicines are effective to treat major depression.   Psychotherapy   Seeing a mental health therapist can  help with all forms of depression. You may need therapy for a short time or for many months. One very helpful form of psychotherapy is cognitive behavioral therapy (CBT). CBT helps you identify and change thought processes that can lead to depression. Replacing negative thoughts with more positive ones reduces depression. Interpersonal therapy has also been shown to work very well.   Diets rich in fruits and vegetables are recommended for people with depression. A multivitamin and mineral supplement may also be recommended.   Claims have been made that certain herbal and dietary products help control depression symptoms. Omega-3 fatty acids may help to reduce symptoms of depression. [place]'s wort may help mild symptoms of depression. It will not help severe cases of depression. It may worsen bipolar disorder. No herb or dietary supplement has been proven to consistently or completely relieve depression. Supplements are not tested or standardized and may vary in strengths and effects. They may have side effects and are not always safe.   Learning ways to relax may help. Yoga and meditation may also be helpful. You may want to talk with your healthcare provider about using these methods along with medicines and psychotherapy.   How long will the effects last?   Major depression usually improves within a few weeks. Some people have it only once, while others have many episodes. Major depression can be shortened, and possibly prevented, with treatment.   What can I do to help myself or my loved one?   Seeking treatment quickly is the best thing to do. Watch closely for the signs of depression. Get treatment before the symptoms become bad.   Certain medicines can add to the symptoms of depression. If you have had depression, tell all healthcare providers who treat you about all medicines you are taking, including nonprescription products and natural remedies.   Maintaining a healthy lifestyle and social activities are  most important. To help prevent depression:   Exercise for at least 30 minutes every day, for example a brisk walk.   Learn which activities make you feel better and do them often.   Talk to your family and friends.   Eat a healthy diet.   Avoid alcohol, caffeine, and nicotine.   Do not use drugs.   Learn ways to lower stress, such as breathing and muscle relaxation exercises.   When should I seek help?   If you are showing the signs of major depression, seek professional help quickly. Do not try to treat your depression by yourself. Professional treatment is necessary.   Most of the time, you will feel much better after a few weeks of treatment. Some people with untreated major depression commit suicide. Many more attempt suicide or try to hurt themselves. After treatment and feeling better, these same people usually cannot believe that once they felt so bad and wanted to die.   Get emergency care if you or a loved one has serious thoughts of suicide or harming others.   For more information, see:   Depression: Its Symptoms and Treatment  Adjustment Disorders with Depressed Mood  Cognitive Therapy    Published by [employer].  This content is reviewed periodically and is subject to change as new health information becomes available. The information is intended to inform and educate and is not a replacement for medical evaluation, advice, diagnosis or treatment by a healthcare professional.   Written by Sanna Balderrama, PhD, for [employer].   ? 2010 [employer] and/or its affiliates. All Rights Reserved.   Copyright   Clinical Reference Systems 2011  Adult Health Advisor                Follow-ups after your visit        Follow-up notes from your care team     Return in about 1 year (around 11/30/2019) for Routine Visit.      Who to contact     If you have questions or need follow up information about today's clinic visit or your schedule please contact [facility] directly at [phone].  Normal  or non-critical lab and imaging results will be communicated to you by WOWIOhart, letter or phone within 4 business days after the clinic has received the results. If you do not hear from us within 7 days, please contact the clinic through Yanado or phone. If you have a critical or abnormal lab result, we will notify you by phone as soon as possible.  Submit refill requests through Yanado or call your pharmacy and they will forward the refill request to us. Please allow 3 business days for your refill to be completed.          Additional Information About Your Visit        Yanado Information     Yanado gives you secure access to your electronic health record. If you see a primary care provider, you can also send messages to your care team and make appointments. If you have questions, please call your primary care clinic.  If you do not have a primary care provider, please call [phone] and they will assist you.        Care EveryWhere ID     This is your Care EveryWhere ID. This could be used by other organizations to access your [facility] medical records  [id number]        Your Vitals Were     Pulse Temperature Respirations BMI (Body Mass Index)          63 97.6  F (36.4  C) (Oral) 16 23.14 kg/m2         Blood Pressure from Last 3 Encounters:   11/30/18 115/74   10/19/18 116/75   07/13/18 116/78    Weight from Last 3 Encounters:   11/30/18 159 lb (72.1 kg)   07/13/18 158 lb 6.4 oz (71.8 kg)   07/12/18 161 lb 4.8 oz (73.2 kg)              Today, you had the following     No orders found for display         Today's Medication Changes          These changes are accurate as of 11/30/18  1:00 PM.  If you have any questions, ask your nurse or doctor.               Stop taking these medicines if you haven't already. Please contact your care team if you have questions.     desogestrel-ethinyl estradiol 0.15-30 MG-MCG tablet   Commonly known as:  APRI   Stopped by:  Robyn Givens APRN CNP                Where  to get your medicines      These medications were sent to [facility] - [place], [place] - [address]  [address], [place] [address]     Phone:  [phone]     PARoxetine 20 MG tablet         Some of these will need a paper prescription and others can be bought over the counter.  Ask your nurse if you have questions.     Bring a paper prescription for each of these medications     LORazepam 0.5 MG tablet                Primary Care Provider Office Phone # Fax #    Robyn CrenshawJIN ram [phone]       [address] [facility] [place]  [facility] [address]        Equal Access to Services     MONSE ANDERSON : Hadii slick lopez hadasho Sojohanna, waaxda luqadaha, qaybta kaalmada adeegyada, lewis mccann . So [facility] [phone].    ATENCIÓN: Si habla español, tiene a erazo disposición servicios gratuitos de asistencia lingüística. [facility] [phone].    We comply with applicable federal civil rights laws and [place] laws. We do not discriminate on the basis of race, color, national origin, age, disability, sex, sexual orientation, or gender identity.            Thank you!     Thank you for choosing [facility]  for your care. Our goal is always to provide you with excellent care. Hearing back from our patients is one way we can continue to improve our services. Please take a few minutes to complete the written survey that you may receive in the mail after your visit with us. Thank you!             Your Updated Medication List - Protect others around you: Learn how to safely use, store and throw away your medicines at www.disposemymeds.org.          This list is accurate as of 11/30/18  1:00 PM.  Always use your most recent med list.                   Brand Name Dispense Instructions for use Diagnosis    doxycycline monohydrate 100 MG capsule    MONODOX    60 capsule    Take 1 cap BID x 3 months with a full glass of water and food.    Acne  vulgaris       LORazepam 0.5 MG tablet    ATIVAN    15 tablet    Take 1 tablet (0.5 mg) by mouth every 8 hours as needed for anxiety    Panic attack       paragard intrauterine copper device      1 each by Intrauterine route once        PARoxetine 20 MG tablet    PAXIL    90 tablet    Take 1 tablet (20 mg) by mouth daily    Adjustment disorder with mixed anxiety and depressed mood       spironolactone 100 MG tablet    ALDACTONE    90 tablet    Take 1 tab PO daily    Acne vulgaris       tazarotene 0.05 % external cream    TAZORAC    60 g    Apply a pea sized amount to entire face every day    Acne vulgaris

## 2018-11-30 NOTE — PROGRESS NOTES
SUBJECTIVE:   Nicolle Devlin is a 28 year old female who presents to clinic today for the following health issues:        Depression and Anxiety Follow-Up    Status since last visit: No change    Other associated symptoms:None    Complicating factors:     Significant life event: No     Current substance abuse: None    PHQ 11/13/2017 7/13/2018 11/30/2018   PHQ-9 Total Score 0 0 0   Q9: Suicide Ideation Not at all Not at all Not at all     SIXTO-7 SCORE 11/13/2017 7/13/2018 11/30/2018   Total Score 1 1 0     HQ-9  English  PHQ-9   Any Language  SIXTO-7  Suicide Assessment Five-step Evaluation and Treatment (SAFE-T)    Amount of exercise or physical activity: 2-3 days/week for an average of 45-60 minutes    Problems taking medications regularly: No    Medication side effects: none    Diet: regular (no restrictions)    Overall doing really well.    Occasionally wondering if she should lower her dose.   Starting to plan a wedding.    Lots of change right now.  Wants to stay on this dose.    Denies any s/e    Problem list and histories reviewed & adjusted, as indicated.  Additional history: as documented      Reviewed and updated as needed this visit by clinical staff  Tobacco  Allergies  Meds  Problems  Med Hx  Surg Hx  Fam Hx  Soc Hx        Reviewed and updated as needed this visit by Provider  Tobacco  Allergies  Meds  Problems  Med Hx  Surg Hx  Fam Hx  Soc Hx          ROS:  Constitutional, HEENT, cardiovascular, pulmonary, GI, , musculoskeletal, neuro, skin, endocrine and psych systems are negative, except as otherwise noted.    OBJECTIVE:     /74  Pulse 63  Temp 97.6  F (36.4  C) (Oral)  Resp 16  Wt 159 lb (72.1 kg)  BMI 23.14 kg/m2  Body mass index is 23.14 kg/(m^2).  GENERAL: healthy, alert and no distress  MS: no gross musculoskeletal defects noted, no edema  SKIN: no suspicious lesions or rashes  PSYCH: mentation appears normal, affect normal/bright  PHQ-9 SCORE 11/13/2017 7/13/2018  11/30/2018   PHQ-9 Total Score 0 0 0     SIXTO-7 SCORE 11/13/2017 7/13/2018 11/30/2018   Total Score 1 1 0             ASSESSMENT/PLAN:       ICD-10-CM    1. Panic attack F41.0 LORazepam (ATIVAN) 0.5 MG tablet   2. Adjustment disorder with mixed anxiety and depressed mood F43.23 PARoxetine (PAXIL) 20 MG tablet     rfill paxil x 1 year.    Discussed not weaning with lots of change with upcoming wedding.    Continue regular exercise  Continue healthy diet.    Counseling if needed.    Use ativan sparingly.    F/u 1 year or sooner if worsening.      See Patient Instructions    JIN [facility]

## 2018-11-30 NOTE — PATIENT INSTRUCTIONS
Major Depression  What is major depression?   Depression is a condition in which you feel sad, hopeless, and uninterested in daily life. Major depression is severe depression that lasts for at least 2 full weeks.   How does it occur?   Major depression may start after some event or it may not be caused by anything specific. You may have major depression after a period of having dysthymia. Dysthymia is being mildly depressed almost every day for 2 or more years. If major depression develops from dysthymia, you are more likely to have major depression in the future.   People are more likely to develop depression if they:   have family members who have had depression, bipolar disorder, or anxiety problems   are female. Women are twice as likely as men to have major depression   have a major medical problem such as heart disease or cancer   The chemicals in your nervous system and the way that brain cells communicate changes with major depression. Exactly how this works and what it means are not fully understood.   Major depression may start at any age. Teenagers and young adults, as well as older adults, are more likely to have this condition than middle-aged adults.   What are the symptoms?   Besides feeling very sad and uninterested in things you usually enjoy, you may also:   be irritable   have trouble falling asleep, wake up very early, or sleep too much   feel more anxiety or panic   notice changes in your appetite and weight, either up or down   notice changes in your energy level, usually down but sometimes feeling overexcited   lose sexual desire and function   feel worthless and guilty   have trouble concentrating or remembering things   feel hopeless or just not care about anything   have unexplained physical symptoms   think often about death or suicide   Other symptoms may vary with age. If you are a teenager, you may be irritable, get angry, abuse substances, and cause trouble with parents  and at school. If you are a young or middle-aged adult, you may abuse substances such as drugs or alcohol, have physical problems (like pain or stomach upsets), or feel nervous.   Depressed older people are more likely to complain of physical problems than that they are feeling sad, anxious, or hopeless. Tiredness, mood changes, sleepiness, and memory problems may be side effects of medicines rather than symptoms of depression. Other medical conditions, such as diabetes, heart disease, and Alzheimer's disease, can also cause similar symptoms.   How is it diagnosed?   Your healthcare provider or a mental health professional will ask about your symptoms and any drug or alcohol use. You may have lab tests to rule out medical problems such as hormone imbalances. There are no lab tests that directly diagnose depression.   How is it treated?   Do not try to overcome clinical depression by yourself. It can usually be successfully treated with psychotherapy, antidepressant medicine, or both. Discuss this with your healthcare provider or therapist.   Medicine  Several types of prescription medicines can help treat major depression. Your healthcare provider will work with you to carefully select the right medicine for you.   You must take these medicines daily for 3 to 6 weeks to get full benefit from them. Most people benefit from taking these medicines for at least 6 months.   No nonprescription medicines are effective to treat major depression.   Psychotherapy   Seeing a mental health therapist can help with all forms of depression. You may need therapy for a short time or for many months. One very helpful form of psychotherapy is cognitive behavioral therapy (CBT). CBT helps you identify and change thought processes that can lead to depression. Replacing negative thoughts with more positive ones reduces depression. Interpersonal therapy has also been shown to work very well.   Diets rich in fruits and vegetables are  recommended for people with depression. A multivitamin and mineral supplement may also be recommended.   Claims have been made that certain herbal and dietary products help control depression symptoms. Omega-3 fatty acids may help to reduce symptoms of depression. [place]'s wort may help mild symptoms of depression. It will not help severe cases of depression. It may worsen bipolar disorder. No herb or dietary supplement has been proven to consistently or completely relieve depression. Supplements are not tested or standardized and may vary in strengths and effects. They may have side effects and are not always safe.   Learning ways to relax may help. Yoga and meditation may also be helpful. You may want to talk with your healthcare provider about using these methods along with medicines and psychotherapy.   How long will the effects last?   Major depression usually improves within a few weeks. Some people have it only once, while others have many episodes. Major depression can be shortened, and possibly prevented, with treatment.   What can I do to help myself or my loved one?   Seeking treatment quickly is the best thing to do. Watch closely for the signs of depression. Get treatment before the symptoms become bad.   Certain medicines can add to the symptoms of depression. If you have had depression, tell all healthcare providers who treat you about all medicines you are taking, including nonprescription products and natural remedies.   Maintaining a healthy lifestyle and social activities are most important. To help prevent depression:   Exercise for at least 30 minutes every day, for example a brisk walk.   Learn which activities make you feel better and do them often.   Talk to your family and friends.   Eat a healthy diet.   Avoid alcohol, caffeine, and nicotine.   Do not use drugs.   Learn ways to lower stress, such as breathing and muscle relaxation exercises.   When should I seek help?   If you are showing  the signs of major depression, seek professional help quickly. Do not try to treat your depression by yourself. Professional treatment is necessary.   Most of the time, you will feel much better after a few weeks of treatment. Some people with untreated major depression commit suicide. Many more attempt suicide or try to hurt themselves. After treatment and feeling better, these same people usually cannot believe that once they felt so bad and wanted to die.   Get emergency care if you or a loved one has serious thoughts of suicide or harming others.   For more information, see:   Depression: Its Symptoms and Treatment  Adjustment Disorders with Depressed Mood  Cognitive Therapy    Published by [employer].  This content is reviewed periodically and is subject to change as new health information becomes available. The information is intended to inform and educate and is not a replacement for medical evaluation, advice, diagnosis or treatment by a healthcare professional.   Written by Sanna Balderrama, PhD, for [employer].   ? 2010 [employer] and/or its affiliates. All Rights Reserved.   Copyright   Clinical Reference Systems 2011  Adult Health Advisor

## 2018-12-01 ASSESSMENT — ANXIETY QUESTIONNAIRES: GAD7 TOTAL SCORE: 0

## 2019-01-29 ENCOUNTER — OFFICE VISIT (OUTPATIENT)
Dept: FAMILY MEDICINE | Facility: CLINIC | Age: 29
End: 2019-01-29
Payer: COMMERCIAL

## 2019-01-29 VITALS
WEIGHT: 160 LBS | SYSTOLIC BLOOD PRESSURE: 122 MMHG | TEMPERATURE: 98.2 F | DIASTOLIC BLOOD PRESSURE: 75 MMHG | BODY MASS INDEX: 23.7 KG/M2 | HEART RATE: 54 BPM | RESPIRATION RATE: 16 BRPM | HEIGHT: 69 IN

## 2019-01-29 DIAGNOSIS — N89.8 VAGINAL DISCHARGE: Primary | ICD-10-CM

## 2019-01-29 LAB
SPECIMEN SOURCE: NORMAL
WET PREP SPEC: NORMAL

## 2019-01-29 PROCEDURE — 87210 SMEAR WET MOUNT SALINE/INK: CPT | Performed by: NURSE PRACTITIONER

## 2019-01-29 PROCEDURE — 99213 OFFICE O/P EST LOW 20 MIN: CPT | Performed by: NURSE PRACTITIONER

## 2019-01-29 ASSESSMENT — MIFFLIN-ST. JEOR: SCORE: 1510.14

## 2019-01-29 NOTE — PROGRESS NOTES
"  SUBJECTIVE:   Nicolle Devlin is a 28 year old female who presents to clinic today for the following health issues:        Pt states she has had her period for 3 weeks off and on. Pt states this is not normal for her.   Has paraguard IUD in for the last 6-7 years.    No new medicines  Recent increase in stress with planning her wedding.    No new sexual partners or products.    No recnet antibiotics.    Has a similar episode several years ago and was noted to have BV when she got checked.    Has not noticed any odor or milky discharge.    Would like to be checked for BV today.      Problem list and histories reviewed & adjusted, as indicated.  Additional history: as documented        Reviewed and updated as needed this visit by clinical staff  Tobacco  Allergies  Meds  Problems  Med Hx  Surg Hx  Fam Hx  Soc Hx        Reviewed and updated as needed this visit by Provider  Tobacco  Allergies  Meds  Problems  Med Hx  Surg Hx  Fam Hx         ROS:  Constitutional, HEENT, cardiovascular, pulmonary, gi and gu systems are negative, except as otherwise noted.    OBJECTIVE:     /75   Pulse 54   Temp 98.2  F (36.8  C) (Oral)   Resp 16   Ht 1.753 m (5' 9\")   Wt 72.6 kg (160 lb)   LMP 01/01/2019 (Exact Date)   BMI 23.63 kg/m    Body mass index is 23.63 kg/m .  GENERAL: healthy, alert and no distress  RESP: lungs clear to auscultation - no rales, rhonchi or wheezes  CV: regular rate and rhythm, normal S1 S2, no S3 or S4, no murmur, click or rub, no peripheral edema and peripheral pulses strong  ABDOMEN: soft, nontender, no hepatosplenomegaly, no masses and bowel sounds normal   (female): normal female external genitalia, normal urethral meatus, vaginal mucosa, IUD strings visualized. normal cervix/adnexa/uterus without masses or discharge  MS: no gross musculoskeletal defects noted, no edema    Results for orders placed or performed in visit on 01/29/19   Wet prep   Result Value Ref Range    " Specimen Description Vagina     Wet Prep No clue cells seen     Wet Prep No yeast seen     Wet Prep No Trichomonas seen          ASSESSMENT/PLAN:       ICD-10-CM    1. Vaginal discharge N89.8 Wet prep     Wet prep normal and vaginal exam normal.   Discussed could do oral contraceptive/hormone to stop period but cautioned this may disrupt normal hormonal cycles.    Pt declined this option at this time.    Will continue to monitor and if persists or worsens may f/u with gynecology for further eval.      See Patient Instructions    JIN Gordon CNP  [facility]

## 2019-03-06 ENCOUNTER — MYC REFILL (OUTPATIENT)
Dept: FAMILY MEDICINE | Facility: CLINIC | Age: 29
End: 2019-03-06

## 2019-03-06 DIAGNOSIS — F41.0 PANIC ATTACK: ICD-10-CM

## 2019-03-12 RX ORDER — LORAZEPAM 0.5 MG/1
0.5 TABLET ORAL EVERY 8 HOURS PRN
Qty: 15 TABLET | Refills: 0 | Status: SHIPPED | OUTPATIENT
Start: 2019-03-12 | End: 2019-10-16

## 2019-05-28 ENCOUNTER — E-VISIT (OUTPATIENT)
Dept: FAMILY MEDICINE | Facility: CLINIC | Age: 29
End: 2019-05-28
Payer: COMMERCIAL

## 2019-05-28 DIAGNOSIS — N93.9 ABNORMAL UTERINE BLEEDING (AUB): Primary | ICD-10-CM

## 2019-05-28 PROCEDURE — 99444 ZZC PHYSICIAN ONLINE EVALUATION & MANAGEMENT SERVICE: CPT | Performed by: NURSE PRACTITIONER

## 2019-05-29 RX ORDER — MEDROXYPROGESTERONE ACETATE 10 MG
10 TABLET ORAL DAILY
Qty: 20 TABLET | Refills: 0 | Status: SHIPPED | OUTPATIENT
Start: 2019-05-29 | End: 2019-10-02

## 2019-06-26 ENCOUNTER — OFFICE VISIT (OUTPATIENT)
Dept: URGENT CARE | Facility: URGENT CARE | Age: 29
End: 2019-06-26
Payer: COMMERCIAL

## 2019-06-26 VITALS
TEMPERATURE: 98.5 F | RESPIRATION RATE: 16 BRPM | BODY MASS INDEX: 23.63 KG/M2 | HEART RATE: 83 BPM | SYSTOLIC BLOOD PRESSURE: 102 MMHG | DIASTOLIC BLOOD PRESSURE: 72 MMHG | OXYGEN SATURATION: 99 % | WEIGHT: 160 LBS

## 2019-06-26 DIAGNOSIS — J00 ACUTE RHINITIS: ICD-10-CM

## 2019-06-26 DIAGNOSIS — R07.0 THROAT PAIN: Primary | ICD-10-CM

## 2019-06-26 DIAGNOSIS — B34.9 VIRAL SYNDROME: ICD-10-CM

## 2019-06-26 LAB
DEPRECATED S PYO AG THROAT QL EIA: NORMAL
SPECIMEN SOURCE: NORMAL

## 2019-06-26 PROCEDURE — 99214 OFFICE O/P EST MOD 30 MIN: CPT | Performed by: PHYSICIAN ASSISTANT

## 2019-06-26 PROCEDURE — 87081 CULTURE SCREEN ONLY: CPT | Performed by: PHYSICIAN ASSISTANT

## 2019-06-26 PROCEDURE — 87880 STREP A ASSAY W/OPTIC: CPT | Performed by: FAMILY MEDICINE

## 2019-06-26 RX ORDER — LORATADINE 10 MG/1
10 TABLET ORAL DAILY
Qty: 30 TABLET | Refills: 0 | Status: SHIPPED | OUTPATIENT
Start: 2019-06-26 | End: 2019-10-02

## 2019-06-26 RX ORDER — IBUPROFEN 800 MG/1
800 TABLET, FILM COATED ORAL EVERY 8 HOURS PRN
Qty: 100 TABLET | Refills: 0 | Status: SHIPPED | OUTPATIENT
Start: 2019-06-26 | End: 2019-10-02

## 2019-06-26 NOTE — PATIENT INSTRUCTIONS
(R07.0) Throat pain  (primary encounter diagnosis)  Comment:   Plan: Strep, Rapid Screen, Beta strep group A         culture, ibuprofen (ADVIL/MOTRIN) 800 MG tablet            (B34.9) Viral syndrome  Comment:   Plan: rest, salt water gargles.        (J00) Acute rhinitis  Comment:   Plan: loratadine (CLARITIN) 10 MG tablet          Follow up with primary clinic should symptoms persist or worsen.

## 2019-06-26 NOTE — PROGRESS NOTES
Patient presents with:  Pharyngitis: Sore throat X 3 days and not getting better      SUBJECTIVE:   Nicolle Devlin is a 28 year old female presenting with a chief complaint of   1) sore throat for 3 days  No runny nose or congestion or fevers.    Denies any cough  Exposed to strep.    Was  5/31/19.  One of her bridesmaids tested positive for strep.        Onset of symptoms was as above.  Course of illness is worsening.    Severity moderate  Current and Associated symptoms: as above  Treatment measures tried include None tried.  Predisposing factors include exposure to strep.    Past Medical History:   Diagnosis Date     Depressive disorder      Patient Active Problem List   Diagnosis     Dysfunction of eustachian tube     CARDIOVASCULAR SCREENING; LDL GOAL LESS THAN 160     SIXTO (generalized anxiety disorder)     Social History     Tobacco Use     Smoking status: Never Smoker     Smokeless tobacco: Never Used   Substance Use Topics     Alcohol use: Yes     Comment: occasionally       ROS:  CONSTITUTIONAL:NEGATIVE for fever, chills, change in weight  INTEGUMENTARY/SKIN: NEGATIVE for worrisome rashes, moles or lesions  EYES: NEGATIVE for vision changes or irritation  ENT/MOUTH: as per HPI  RESP:NEGATIVE for significant cough or SOB  CV: NEGATIVE for chest pain, palpitations or peripheral edema  GI: NEGATIVE for nausea, abdominal pain, heartburn, or change in bowel habits  MUSCULOSKELETAL: NEGATIVE for significant arthralgias or myalgia  NEURO: NEGATIVE for weakness, dizziness or paresthesias  Review of systems negative except as stated above.    OBJECTIVE  :/72 (BP Location: Left arm, Patient Position: Sitting, Cuff Size: Adult Regular)   Pulse 83   Temp 98.5  F (36.9  C) (Oral)   Resp 16   Wt 72.6 kg (160 lb)   SpO2 99%   BMI 23.63 kg/m    GENERAL APPEARANCE: healthy, alert and no distress  EYES: EOMI,  PERRL, conjunctiva clear  HENT: ear canals and TM's normal.  Nose with boggy blue turbinates  and clear coryza and mouth without ulcers, erythema or lesions  NECK: supple, nontender, no lymphadenopathy  RESP: lungs clear to auscultation - no rales, rhonchi or wheezes  CV: regular rates and rhythm, normal S1 S2, no murmur noted  ABDOMEN:  soft, nontender, no HSM or masses and bowel sounds normal  NEURO: Normal strength and tone, sensory exam grossly normal,  normal speech and mentation  SKIN: no suspicious lesions or rashes    (R07.0) Throat pain  (primary encounter diagnosis)  Comment:   Plan: Strep, Rapid Screen, Beta strep group A         culture, ibuprofen (ADVIL/MOTRIN) 800 MG tablet            (B34.9) Viral syndrome  Comment:   Plan: rest, salt water gargles.        (J00) Acute rhinitis  Comment:   Plan: loratadine (CLARITIN) 10 MG tablet          Follow up with primary clinic should symptoms persist or worsen.    Patient expresses understanding and agreement with the assessment and plan as above.

## 2019-06-27 LAB
BACTERIA SPEC CULT: NORMAL
SPECIMEN SOURCE: NORMAL

## 2019-07-14 ENCOUNTER — MYC REFILL (OUTPATIENT)
Dept: FAMILY MEDICINE | Facility: CLINIC | Age: 29
End: 2019-07-14

## 2019-07-14 DIAGNOSIS — F43.23 ADJUSTMENT DISORDER WITH MIXED ANXIETY AND DEPRESSED MOOD: ICD-10-CM

## 2019-07-16 ENCOUNTER — MYC REFILL (OUTPATIENT)
Dept: FAMILY MEDICINE | Facility: CLINIC | Age: 29
End: 2019-07-16

## 2019-07-16 DIAGNOSIS — F43.23 ADJUSTMENT DISORDER WITH MIXED ANXIETY AND DEPRESSED MOOD: ICD-10-CM

## 2019-07-16 RX ORDER — PAROXETINE 20 MG/1
20 TABLET, FILM COATED ORAL DAILY
Qty: 90 TABLET | Refills: 3 | Status: CANCELLED | OUTPATIENT
Start: 2019-07-16

## 2019-07-17 ENCOUNTER — MYC MEDICAL ADVICE (OUTPATIENT)
Dept: FAMILY MEDICINE | Facility: CLINIC | Age: 29
End: 2019-07-17

## 2019-07-23 RX ORDER — PAROXETINE 20 MG/1
20 TABLET, FILM COATED ORAL DAILY
Qty: 90 TABLET | Refills: 1 | Status: SHIPPED | OUTPATIENT
Start: 2019-07-23 | End: 2019-10-01

## 2019-09-19 ENCOUNTER — E-VISIT (OUTPATIENT)
Dept: FAMILY MEDICINE | Facility: CLINIC | Age: 29
End: 2019-09-19
Payer: COMMERCIAL

## 2019-09-19 DIAGNOSIS — F32.A DEPRESSION: Primary | ICD-10-CM

## 2019-09-19 PROCEDURE — 99207 ZZC NO BILLABLE SERVICE THIS VISIT: CPT | Performed by: NURSE PRACTITIONER

## 2019-09-19 ASSESSMENT — ANXIETY QUESTIONNAIRES
GAD7 TOTAL SCORE: 7
6. BECOMING EASILY ANNOYED OR IRRITABLE: SEVERAL DAYS
2. NOT BEING ABLE TO STOP OR CONTROL WORRYING: SEVERAL DAYS
GAD7 TOTAL SCORE: 7
GAD7 TOTAL SCORE: 7
1. FEELING NERVOUS, ANXIOUS, OR ON EDGE: SEVERAL DAYS
7. FEELING AFRAID AS IF SOMETHING AWFUL MIGHT HAPPEN: SEVERAL DAYS
4. TROUBLE RELAXING: SEVERAL DAYS
7. FEELING AFRAID AS IF SOMETHING AWFUL MIGHT HAPPEN: SEVERAL DAYS
3. WORRYING TOO MUCH ABOUT DIFFERENT THINGS: SEVERAL DAYS
5. BEING SO RESTLESS THAT IT IS HARD TO SIT STILL: SEVERAL DAYS

## 2019-09-19 ASSESSMENT — PATIENT HEALTH QUESTIONNAIRE - PHQ9
SUM OF ALL RESPONSES TO PHQ QUESTIONS 1-9: 14
SUM OF ALL RESPONSES TO PHQ QUESTIONS 1-9: 14
10. IF YOU CHECKED OFF ANY PROBLEMS, HOW DIFFICULT HAVE THESE PROBLEMS MADE IT FOR YOU TO DO YOUR WORK, TAKE CARE OF THINGS AT HOME, OR GET ALONG WITH OTHER PEOPLE: SOMEWHAT DIFFICULT

## 2019-09-20 ENCOUNTER — TELEPHONE (OUTPATIENT)
Dept: FAMILY MEDICINE | Facility: CLINIC | Age: 29
End: 2019-09-20

## 2019-09-20 ASSESSMENT — ANXIETY QUESTIONNAIRES: GAD7 TOTAL SCORE: 7

## 2019-09-20 ASSESSMENT — PATIENT HEALTH QUESTIONNAIRE - PHQ9: SUM OF ALL RESPONSES TO PHQ QUESTIONS 1-9: 14

## 2019-09-20 NOTE — TELEPHONE ENCOUNTER
Please call her and triage her depression.  Not sure if she needs ER or urgent care today.     I want more information on the SI thoughts...

## 2019-09-20 NOTE — TELEPHONE ENCOUNTER
Per Provider request reaching out to patient due to answers in the E visit noting depression and some SI thoughts.       Left message asking patient to call or will reach out again .  July Richard RN

## 2019-09-24 ENCOUNTER — MYC MEDICAL ADVICE (OUTPATIENT)
Dept: FAMILY MEDICINE | Facility: CLINIC | Age: 29
End: 2019-09-24

## 2019-10-01 ENCOUNTER — OFFICE VISIT (OUTPATIENT)
Dept: FAMILY MEDICINE | Facility: CLINIC | Age: 29
End: 2019-10-01
Payer: COMMERCIAL

## 2019-10-01 VITALS
WEIGHT: 160 LBS | SYSTOLIC BLOOD PRESSURE: 111 MMHG | DIASTOLIC BLOOD PRESSURE: 71 MMHG | RESPIRATION RATE: 16 BRPM | BODY MASS INDEX: 23.63 KG/M2 | TEMPERATURE: 98.3 F | OXYGEN SATURATION: 98 % | HEART RATE: 74 BPM

## 2019-10-01 DIAGNOSIS — F43.23 ADJUSTMENT DISORDER WITH MIXED ANXIETY AND DEPRESSED MOOD: ICD-10-CM

## 2019-10-01 LAB — FOLATE SERPL-MCNC: 18.8 NG/ML

## 2019-10-01 PROCEDURE — 82746 ASSAY OF FOLIC ACID SERUM: CPT | Performed by: NURSE PRACTITIONER

## 2019-10-01 PROCEDURE — 83550 IRON BINDING TEST: CPT | Performed by: NURSE PRACTITIONER

## 2019-10-01 PROCEDURE — 84443 ASSAY THYROID STIM HORMONE: CPT | Performed by: NURSE PRACTITIONER

## 2019-10-01 PROCEDURE — 36415 COLL VENOUS BLD VENIPUNCTURE: CPT | Performed by: NURSE PRACTITIONER

## 2019-10-01 PROCEDURE — 82306 VITAMIN D 25 HYDROXY: CPT | Performed by: NURSE PRACTITIONER

## 2019-10-01 PROCEDURE — 83540 ASSAY OF IRON: CPT | Performed by: NURSE PRACTITIONER

## 2019-10-01 PROCEDURE — 99213 OFFICE O/P EST LOW 20 MIN: CPT | Performed by: NURSE PRACTITIONER

## 2019-10-01 RX ORDER — PAROXETINE 20 MG/1
30 TABLET, FILM COATED ORAL DAILY
Qty: 135 TABLET | Refills: 0 | Status: SHIPPED | OUTPATIENT
Start: 2019-10-01 | End: 2020-01-06

## 2019-10-01 NOTE — PROGRESS NOTES
SUBJECTIVE:  Nicolle MORRISON is a 28 year old female presents today for follow up of depressive symptoms. Current symptoms include depressed mood. She was started on  Paxil/Paroxetine many  year(s) ago and is not having side effects as none.  Symptoms have been gradually worsening.  Depression risk factors: previous episode of depression.    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       supportive.    Not exercising at all  Eating well    Cant get out of bed  Denies SI or plan    Current thoughts of suicide or homicide:No  Other treatment modalities employed include none.     The history section was last reviewed by Kenji Fountain CMA on Jun 26, 2019.    REVIEW OF SYSTEMS   ROS: 10 point ROS neg other than the symptoms noted above in the HPI.      OBJECTIVE:  /71   Pulse 74   Temp 98.3  F (36.8  C)   Resp 16   Wt 72.6 kg (160 lb)   SpO2 98%   BMI 23.63 kg/m      no apparent distress  Normal: Abstract reasoning  Attention and concentration  Coherency and relevance of thought  Dress, grooming, personal hygiene  Facial expression  Information  Judgment  Memory  Mood  Orientation  Perceptions  Posture and motor behavior  Speech  Thought content  Vocabulary  Abnormal:   PHQ-9 SCORE 7/13/2018 11/30/2018 9/19/2019   PHQ-9 Total Score MyChart - - 14 (Moderate depression)   PHQ-9 Total Score 0 0 14         ASSESSMENT:  Recurrent Depression (311)  Anxiety (300.02)       PLAN:    Refer for counseling.    Draw vit D, folate, TIBC and TSH today to rule out as a cause for depression and anxiety.    No harm contract agreed to.   Increase exercise - yoga and walking helps.    Increase antioxidants in your diet.     Minimize alcohol and caffeine, they make this worse.    F/u in 1 month or sooner if worsening.    Change dose of medication- increase paxil to 30 mg daily.    Reviewed concept of depression as function of biochemical imbalance of neurotransmitters/rationale for treatment.  Risks and benefits of medication(s) reviewed with patient.  Questions answered.  Counseling advised  Followup appointment in 1 month(s) for PHQ evisit OK, if worsening return to the clinic  Patient instructed to call for significant side effects medications or problems  Patient advised immediate presentation to hospital for suicidal thought, etc.    No-harm verbal contract agreed upon    Robyn Givens RN, CNP

## 2019-10-02 ENCOUNTER — OFFICE VISIT (OUTPATIENT)
Dept: URGENT CARE | Facility: URGENT CARE | Age: 29
End: 2019-10-02
Payer: COMMERCIAL

## 2019-10-02 VITALS
TEMPERATURE: 98.6 F | HEART RATE: 62 BPM | BODY MASS INDEX: 22.59 KG/M2 | SYSTOLIC BLOOD PRESSURE: 130 MMHG | DIASTOLIC BLOOD PRESSURE: 87 MMHG | WEIGHT: 153 LBS

## 2019-10-02 DIAGNOSIS — M26.609 TMJ (TEMPOROMANDIBULAR JOINT SYNDROME): Primary | ICD-10-CM

## 2019-10-02 LAB
DEPRECATED CALCIDIOL+CALCIFEROL SERPL-MC: 30 UG/L (ref 20–75)
IRON SATN MFR SERPL: 19 % (ref 15–46)
IRON SERPL-MCNC: 75 UG/DL (ref 35–180)
TIBC SERPL-MCNC: 398 UG/DL (ref 240–430)
TSH SERPL DL<=0.005 MIU/L-ACNC: 0.5 MU/L (ref 0.4–4)

## 2019-10-02 PROCEDURE — 99213 OFFICE O/P EST LOW 20 MIN: CPT | Performed by: PHYSICIAN ASSISTANT

## 2019-10-02 RX ORDER — IBUPROFEN 800 MG/1
800 TABLET, FILM COATED ORAL EVERY 8 HOURS PRN
Qty: 40 TABLET | Refills: 0 | Status: SHIPPED | OUTPATIENT
Start: 2019-10-02 | End: 2023-05-24

## 2019-10-02 RX ORDER — METHOCARBAMOL 750 MG/1
750 TABLET, FILM COATED ORAL 4 TIMES DAILY PRN
Qty: 40 TABLET | Refills: 0 | Status: SHIPPED | OUTPATIENT
Start: 2019-10-02 | End: 2023-05-24

## 2019-10-02 NOTE — PATIENT INSTRUCTIONS
(W78.749) TMJ (temporomandibular joint syndrome)  (primary encounter diagnosis)  Comment:   Plan: methocarbamol (ROBAXIN) 750 MG tablet,         ibuprofen (ADVIL/MOTRIN) 800 MG tablet,         OTOLARYNGOLOGY REFERRAL              Patient Education     Pain Relief Methods for Temporomandibular Disorders (TMD)  You have been diagnosed with temporomandibular disorder (TMD). This term describes a group of problems related to the temporomandibular joint (TMJ) and nearby muscles. The TMJ is located where the upper and lower jaws meet. TMD can cause painful and frustrating symptoms. But your healthcare provider can recommend various pain relief methods as part of your treatment. These may include medicines and certain types of therapy, such as massage or gentle exercise.  Using medicines    Medicines may be prescribed to treat TMD. Others may be available over the counter. The medicine type and dosage will depend on the problem you have. For your safety, tell your healthcare provider if you are currently taking any medicines. Also mention any vitamins, herbs, or supplements you are using. Common medicines used to treat TMD include:    Anti-inflammatories and analgesics. These treat pain, inflammation, osteoarthritis, and rheumatoid arthritis. Anti-inflammatories reduce swelling, heat, redness, and pain. They also help restore function. Analgesics reduce pain. Nonsteroidal anti-inflammatories (NSAIDs) relieve inflammation as well as pain.    Muscle relaxants. These treat myofascial pain. This is pain that occurs in the soft tissues or muscles around the TMJ. Muscle relaxants help ease muscle tension. This reduces pressure on the TMJ from tight jaw muscles.    Antidepressants. These can be used to reduce pain or teeth grinding (bruxism). At higher dosages, these medicines are used to treat depression. Given at low dosages, antidepressants help relieve TMD symptoms. They can reduce muscle pain. They also raise the level of  serotonin, a body chemical that improves sleep. This in turn can decrease bruxism during the night.  Treating painful muscles  A trigger point is a painful spot in a tight muscle. It is often painful to the touch and may refer pain to other places. Your healthcare provider can focus on trigger points using:    Massage, both inside and outside the mouth. This relaxes muscles and improves circulation.    Palpation, which is applying pressure to points of the jaw and face with the fingers.    Cold spray and stretching of the muscles to relax them.    An anesthetic for pain relief. This may be given as an injection by your dentist.  Treating the joint  Therapy may focus directly on the TMJ. There are different ways to treat the joint:    A self-care program helps you treat and manage symptoms on your own. Your program may include exercises. It may also include using ice and heat to relieve pain.    Gentle manipulation reduces pain and restores range of motion. The healthcare provider uses his or her hands to relax muscles and ligaments around the joint.    Exercises strengthen muscles in the jaw and face.    Ultrasound uses sound waves to reduce pain and swelling. It also improves pain and swelling.  Treating inflammation  When the joint is inflamed, movement becomes difficult. It is even impossible at times. Your healthcare provider can help. Treatment may include:    Rest and gentle exercise. This is done to increase range of motion. One common exercise is to apply pressure to the jaw and resist the movement (isometric exercise).    A cold pack. This eases swelling and reduces pain. A cold pack may be applied for 10 to 20 minutes. Repeat 3 or 4 times a day. To make a cold pack, put ice cubes in a plastic bag that seals at the top. Wrap the bag in a clean, thin towel or cloth. Never put ice or a cold pack directly on the skin.    Massage and gentle manipulation.  As described above.     Date Last Reviewed: 8/1/2017     3035-9499 [employer]. [address] [place] [address], [place] [address]. All rights reserved. This information is not intended as a substitute for professional medical care. Always follow your healthcare professional's instructions.           Patient Education     Understanding Temporomandibular Disorders (TMD)    Do you have pain in your face, jaw, or teeth? Do you have trouble chewing? Does your jaw make clicking or popping noises? These symptoms can be caused by temporomandibular disorders (TMD). This term describes a group of problems related to the temporomandibular joint (TMJ) and nearby muscles. Your symptoms may be painful and frustrating. But don t worry. Your healthcare team can help you treat TMD and prevent future problems.  What s wrong?  TMD causes many kinds of symptoms. That s part of the reason it can be hard to diagnose. You may have headaches, tooth pain, or muscle aches. Your pain may be constant. Or it may come and go without any apparent reason. TMD-related problems include:    Tight muscles    Joint inflammation    Joint damage    Teeth grinding or clenching  What can you do?  If you are having TMD symptoms, don t wait. Call your dentist or healthcare provider right away. You don t have to live with pain or discomfort. TMD can be treated. In fact, a key part of treatment is learning to manage your condition at home.  Which treatment is right for you?  Treatment helps rest the muscles and joint. It also helps relieve symptoms and restore function. Depending on the type of problem you have, your treatment plan may include:    Short-term (temporary) diet changes    New habits for managing stress and maintaining the health of your jaw    Medicine to reduce pain and inflammation    Therapy to reduce pressure on the joint and restore function    Dental treatment to reduce pressure on the joint  How can you avoid future problems?  Treatment can help relieve your current condition. But TMD  symptoms may return over time. You can avoid future problems by maintaining the health of your jaw:    Stay away foods and habits that make your symptoms worse.    Lower the stress level in your life.    Follow your treatment plan.    Pay attention to your body and get help if symptoms return.  Date Last Reviewed: 8/1/2017 2000-2018 [employer]. [address] [place] [address], [place] [address]. All rights reserved. This information is not intended as a substitute for professional medical care. Always follow your healthcare professional's instructions.

## 2019-10-02 NOTE — PROGRESS NOTES
Patient presents with:  Urgent Care: right ear/jaw and temple pain that started one week ago.     SUBJECTIVE:  Chief Complaint   Patient presents with     Urgent Care     right ear/jaw and temple pain that started one week ago.      Nicolle MORRISON is a 28 year old female presents with a chief complaint of left sided jaw pain for the past week, worse with chewing.  Has had increased anxiety recently.  No trauma.  No URI symptoms, no dental pain or issues.      Past Medical History:   Diagnosis Date     Depressive disorder      Patient Active Problem List   Diagnosis     Dysfunction of eustachian tube     CARDIOVASCULAR SCREENING; LDL GOAL LESS THAN 160     SIXTO (generalized anxiety disorder)     Social History     Tobacco Use     Smoking status: Never Smoker     Smokeless tobacco: Never Used   Substance Use Topics     Alcohol use: Yes     Comment: occasionally       ROS:  CONSTITUTIONAL:NEGATIVE for fever, chills, change in weight  INTEGUMENTARY/SKIN: NEGATIVE for worrisome rashes, moles or lesions  ENT/MOUTH: as per HPI  RESP:NEGATIVE for significant cough or SOB  CV: NEGATIVE for chest pain, palpitations or peripheral edema  MUSCULOSKELETAL: NEGATIVE for significant arthralgias or myalgia  NEURO: NEGATIVE for weakness, dizziness or paresthesias  Review of systems negative except as stated above.    EXAM:   /87   Pulse 62   Temp 98.6  F (37  C) (Oral)   Wt 69.4 kg (153 lb)   BMI 22.59 kg/m    Gen: healthy,alert,no distress  GENERAL APPEARANCE: healthy, alert and no distress  OP: no erythema or lesions.  No dental tenderness or obvious abnormality  Tenderness at left TMJ.  No clicking or crepitus.    NECK: supple, non-tender to palpation, FROM   SKIN: no suspicious lesions or rashes  NEURO: Normal strength and tone, sensory exam grossly normal, mentation intact and speech normal    (M26.609) TMJ (temporomandibular joint syndrome)  (primary encounter diagnosis)  Comment:   Plan: methocarbamol (ROBAXIN)  750 MG tablet,         ibuprofen (ADVIL/MOTRIN) 800 MG tablet,     Avoid hard to chew foods.     See handout       Follow up with [facility] Ne[facility] (TMJ) clinic should symptoms persist or worsen        OTOLARYNGOLOGY REFERRAL

## 2019-10-16 ENCOUNTER — MYC REFILL (OUTPATIENT)
Dept: FAMILY MEDICINE | Facility: CLINIC | Age: 29
End: 2019-10-16

## 2019-10-16 DIAGNOSIS — F41.0 PANIC ATTACK: ICD-10-CM

## 2019-10-16 RX ORDER — LORAZEPAM 0.5 MG/1
0.5 TABLET ORAL EVERY 8 HOURS PRN
Qty: 15 TABLET | Refills: 0 | Status: SHIPPED | OUTPATIENT
Start: 2019-10-16 | End: 2020-01-09

## 2019-10-16 NOTE — TELEPHONE ENCOUNTER
Routing refill request to provider for review/approval because:  Drug not on the [facility] refill protocol     Last visit 10/1/2019  Last refill 3/12/2019 #15 no refills

## 2020-01-06 ENCOUNTER — MYC REFILL (OUTPATIENT)
Dept: FAMILY MEDICINE | Facility: CLINIC | Age: 30
End: 2020-01-06

## 2020-01-06 DIAGNOSIS — F43.23 ADJUSTMENT DISORDER WITH MIXED ANXIETY AND DEPRESSED MOOD: ICD-10-CM

## 2020-01-06 RX ORDER — PAROXETINE 20 MG/1
30 TABLET, FILM COATED ORAL DAILY
Qty: 45 TABLET | Refills: 0 | Status: SHIPPED | OUTPATIENT
Start: 2020-01-06 | End: 2020-01-09

## 2020-01-06 NOTE — TELEPHONE ENCOUNTER
"Requested Prescriptions   Pending Prescriptions Disp Refills     PARoxetine (PAXIL) 20 MG tablet 135 tablet 0     Sig: Take 1.5 tablets (30 mg) by mouth daily       SSRIs Protocol Failed - 1/6/2020  5:35 PM        Failed - PHQ-9 score less than 5 in past 6 months     Please review last PHQ-9 score.     PHQ-9 SCORE 7/13/2018 11/30/2018 9/19/2019   PHQ-9 Total Score MyChart - - 14 (Moderate depression)   PHQ-9 Total Score 0 0 14               Passed - Medication is active on med list        Passed - Patient is age 18 or older        Passed - No active pregnancy on record        Passed - No positive pregnancy test in last 12 months        Passed - Recent (6 mo) or future (30 days) visit within the authorizing provider's specialty     Patient had office visit in the last 6 months or has a visit in the next 30 days with authorizing provider or within the authorizing provider's specialty.  See \"Patient Info\" tab in inbasket, or \"Choose Columns\" in Meds & Orders section of the refill encounter.                Medication is being filled for 1 time refill only due to:  Patient needs to be seen because due for one month f/u.     Signed Prescriptions:                        Disp   Refills    PARoxetine (PAXIL) 20 MG tablet            45 tab*0        Sig: Take 1.5 tablets (30 mg) by mouth daily  Authorizing Provider: IMELDA ANGUIANO  Ordering User: JOSHUA MIRZA    Sent mychart    "

## 2020-01-07 ENCOUNTER — E-VISIT (OUTPATIENT)
Dept: FAMILY MEDICINE | Facility: CLINIC | Age: 30
End: 2020-01-07
Payer: COMMERCIAL

## 2020-01-07 DIAGNOSIS — F41.0 PANIC ATTACK: ICD-10-CM

## 2020-01-07 DIAGNOSIS — F43.23 ADJUSTMENT DISORDER WITH MIXED ANXIETY AND DEPRESSED MOOD: ICD-10-CM

## 2020-01-07 PROCEDURE — 99207 ZZC NO BILLABLE SERVICE THIS VISIT: CPT | Performed by: NURSE PRACTITIONER

## 2020-01-09 RX ORDER — PAROXETINE 20 MG/1
30 TABLET, FILM COATED ORAL DAILY
Qty: 135 TABLET | Refills: 1 | Status: SHIPPED | OUTPATIENT
Start: 2020-01-09 | End: 2020-07-31

## 2020-01-09 RX ORDER — LORAZEPAM 0.5 MG/1
0.5 TABLET ORAL EVERY 8 HOURS PRN
Qty: 15 TABLET | Refills: 0 | Status: SHIPPED | OUTPATIENT
Start: 2020-01-09 | End: 2020-03-04

## 2020-03-03 DIAGNOSIS — F41.0 PANIC ATTACK: ICD-10-CM

## 2020-03-03 NOTE — TELEPHONE ENCOUNTER
Requested Prescriptions   Pending Prescriptions Disp Refills     LORazepam (ATIVAN) 0.5 MG tablet [Pharmacy Med Name: LORAZEPAM 0.5MG TABS] 15 tablet 0     Sig: TAKE ONE TABLET BY MOUTH EVERY 8 HOURS AS NEEDED FOR ANXIETY       There is no refill protocol information for this order              Last Written Prescription Date:  1/9/20  Last Fill Quantity: 15,   # refills: 0  Last Office Visit: 10/1/20  Future Office visit:       Routing refill request to provider for review/approval because:  Drug not on the [facility], [facility] or [facility] refill protocol or controlled substance

## 2020-03-04 RX ORDER — LORAZEPAM 0.5 MG/1
TABLET ORAL
Qty: 15 TABLET | Refills: 0 | Status: SHIPPED | OUTPATIENT
Start: 2020-03-04 | End: 2020-05-01

## 2020-03-10 ENCOUNTER — HEALTH MAINTENANCE LETTER (OUTPATIENT)
Age: 30
End: 2020-03-10

## 2020-04-20 DIAGNOSIS — F41.0 PANIC ATTACK: ICD-10-CM

## 2020-04-20 NOTE — TELEPHONE ENCOUNTER
Routing refill request to provider for review/approval because:  Drug not on the [facility] refill protocol   Last filled: 3/4/2020 #15 no refills    Last visit: 1/7/2020

## 2020-04-21 NOTE — TELEPHONE ENCOUNTER
Last Written Prescription Date:  3/4/2020  Last Fill Quantity: 15,  # refills: 0   Last office visit: 10/1/2019 with prescribing provider:     Future Office Visit:  .  Routing refill request to provider for review/approval because:  Drug not on the [facility] refill protocol   Indu Siddiqui RN

## 2020-04-22 ENCOUNTER — MYC REFILL (OUTPATIENT)
Dept: FAMILY MEDICINE | Facility: CLINIC | Age: 30
End: 2020-04-22

## 2020-04-22 DIAGNOSIS — F41.0 PANIC ATTACK: ICD-10-CM

## 2020-04-22 RX ORDER — LORAZEPAM 0.5 MG/1
TABLET ORAL
Qty: 15 TABLET | Refills: 0 | OUTPATIENT
Start: 2020-04-22

## 2020-04-22 RX ORDER — LORAZEPAM 0.5 MG/1
TABLET ORAL
Qty: 15 TABLET | Refills: 0 | Status: CANCELLED | OUTPATIENT
Start: 2020-04-22

## 2020-04-27 ENCOUNTER — E-VISIT (OUTPATIENT)
Dept: FAMILY MEDICINE | Facility: CLINIC | Age: 30
End: 2020-04-27
Payer: COMMERCIAL

## 2020-04-27 DIAGNOSIS — Z53.9 ERRONEOUS ENCOUNTER--DISREGARD: Primary | ICD-10-CM

## 2020-05-01 ENCOUNTER — VIRTUAL VISIT (OUTPATIENT)
Dept: FAMILY MEDICINE | Facility: CLINIC | Age: 30
End: 2020-05-01
Payer: COMMERCIAL

## 2020-05-01 DIAGNOSIS — F41.0 PANIC ATTACK: ICD-10-CM

## 2020-05-01 PROCEDURE — 99213 OFFICE O/P EST LOW 20 MIN: CPT | Mod: 95 | Performed by: NURSE PRACTITIONER

## 2020-05-01 RX ORDER — LORAZEPAM 0.5 MG/1
0.5 TABLET ORAL AT BEDTIME
Qty: 30 TABLET | Refills: 1 | Status: SHIPPED | OUTPATIENT
Start: 2020-05-01 | End: 2020-07-29

## 2020-05-01 NOTE — PROGRESS NOTES
"Nicolle MORRISON is a 29 year old female who is being evaluated via a billable video visit.      The patient has been notified of following:     \"This video visit will be conducted via a call between you and your physician/provider. We have found that certain health care needs can be provided without the need for an in-person physical exam.  This service lets us provide the care you need with a video conversation.  If a prescription is necessary we can send it directly to your pharmacy.  If lab work is needed we can place an order for that and you can then stop by our lab to have the test done at a later time.    Video visits are billed at different rates depending on your insurance coverage.  Please reach out to your insurance provider with any questions.    If during the course of the call the physician/provider feels a video visit is not appropriate, you will not be charged for this service.\"    Patient has given verbal consent for Video visit? Yes    How would you like to obtain your AVS? Adarsh    Patient would like the video invitation sent by: Text to cell phone: [phone]     Will anyone else be joining your video visit? No      Subjective     Nicolle MORRISON is a 29 year old female who presents to clinic today for the following health issues:    HPI  Anxiety Follow-Up    How are you doing with your anxiety since your last visit? Worsened  With COVID    Are you having other symptoms that might be associated with anxiety? Yes:  insomnia    Have you had a significant life event? OTHER: COVID     Are you feeling depressed? No    Do you have any concerns with your use of alcohol or other drugs? No    Social History     Tobacco Use     Smoking status: Never Smoker     Smokeless tobacco: Never Used   Substance Use Topics     Alcohol use: Yes     Comment: occasionally     Drug use: No     SIXTO-7 SCORE 11/30/2018 9/19/2019 1/8/2020   Total Score - 7 (mild anxiety) 0 (minimal anxiety)   Total Score 0 7 0 " "    PHQ 11/30/2018 9/19/2019 1/8/2020   PHQ-9 Total Score 0 14 2   Q9: Thoughts of better off dead/self-harm past 2 weeks Not at all Several days Not at all   F/U: Thoughts of suicide or self-harm - No -   F/U: Safety concerns - No -           How many servings of fruits and vegetables do you eat daily?  4 or more    On average, how many sweetened beverages do you drink each day (Examples: soda, juice, sweet tea, etc.  Do NOT count diet or artificially sweetened beverages)?   0    How many days per week do you exercise enough to make your heart beat faster? 6    How many minutes a day do you exercise enough to make your heart beat faster? 20 - 29    How many days per week do you miss taking your medication? 0        Video Start Time: 3:00    Working on [employer] r/o floor at [employer].    Working mostly as charge nurse  Not sleeping well  Using benadryl, melatonin, and unisom, then backup with ativan if the above don't work.   Relying on ativan once or twice a week         Reviewed and updated as needed this visit by Provider         Review of Systems   ROS COMP: Constitutional, HEENT, cardiovascular, pulmonary, GI, , musculoskeletal, neuro, skin, endocrine and psych systems are negative, except as otherwise noted.      Objective    There were no vitals taken for this visit.  Estimated body mass index is 22.59 kg/m  as calculated from the following:    Height as of 1/29/19: 1.753 m (5' 9\").    Weight as of 10/2/19: 69.4 kg (153 lb).  Physical Exam     GENERAL: healthy, alert and no distress  EYES: Eyes grossly normal to inspection, conjunctivae and sclerae normal  RESP: no audible wheeze, cough, or visible cyanosis.  No visible retractions or increased work of breathing.  Able to speak fully in complete sentences.  NEURO: Cranial nerves grossly intact, mentation intact and speech normal  PSYCH: mentation appears normal, affect normal/bright, judgement and insight intact, normal speech and appearance well-groomed         "      Assessment & Plan       ICD-10-CM    1. Panic attack  F41.0 LORazepam (ATIVAN) 0.5 MG tablet     OK to use ativan for panic or to help shut her mind down when she cant sleep sparingly.    Discussed the pathophysiology of anxiety episodes and the various symptoms seen associated with anxiety episodes.  Discussed possible triggers including fatigue, depression, stress, and chemicals such as alcohol, caffeine and certain drugs.Discussed the treatment including an aerobic exercise program, adequate rest, and both rescue meds and maintenance meds.    No follow-ups on file.    JIN Gordon CNP  [facility]      Video-Visit Details    Type of service:  Video Visit    Video End Time: 3:14    Originating Location (pt. Location): Home    Distant Location (provider location):  [facility]     Platform used for Video Visit: [employer]    No follow-ups on file.       JIN Gordon CNP

## 2020-07-29 DIAGNOSIS — F41.0 PANIC ATTACK: ICD-10-CM

## 2020-07-29 RX ORDER — LORAZEPAM 0.5 MG/1
TABLET ORAL
Qty: 30 TABLET | Refills: 1 | Status: SHIPPED | OUTPATIENT
Start: 2020-07-29 | End: 2020-11-21

## 2020-10-01 DIAGNOSIS — F41.0 PANIC ATTACK: ICD-10-CM

## 2020-10-01 NOTE — LETTER
October 2, 2020      [facility]  [address]   [place] [address]        Brittni Valverde,      We received a refill request for LORazepam (ATIVAN) 0.5 MG tablet; however, you are due for a medication check visit to receive a refill. Please call us at [phone] at your earliest convenience to schedule an appointment.       We greatly appreciate the opportunity to serve you and thank you for trusting us with your health care.        Your health care team at [facility]             Sincerely,        JIN Gordon CNP

## 2020-10-02 RX ORDER — LORAZEPAM 0.5 MG/1
TABLET ORAL
Qty: 30 TABLET | Refills: 1 | OUTPATIENT
Start: 2020-10-02

## 2020-10-02 NOTE — TELEPHONE ENCOUNTER
DELMI to call back and schedule a med check visit. Sent [employer] message and letter.    Tamanna HERNANDEZ  [facility]

## 2020-10-02 NOTE — TELEPHONE ENCOUNTER
Routing refill request to provider for review/approval because:  --Drug not on the [facility] refill protocol LORazepam (ATIVAN) 0.5 MG tablet.      --Last visit:  5/1/2020 [place].  --Future Office Visit:  none        LAST ORDER--     Disp Refills Start End SIMI   LORazepam (ATIVAN) 0.5 MG tablet 30 tablet 1 7/29/2020  No   Sig: TAKE ONE TABLET BY MOUTH EVERY NIGHT AT BEDTIME

## 2020-10-22 DIAGNOSIS — F43.23 ADJUSTMENT DISORDER WITH MIXED ANXIETY AND DEPRESSED MOOD: ICD-10-CM

## 2020-10-23 RX ORDER — PAROXETINE 20 MG/1
TABLET, FILM COATED ORAL
Qty: 135 TABLET | Refills: 0 | Status: SHIPPED | OUTPATIENT
Start: 2020-10-23 | End: 2021-01-06

## 2020-11-09 ENCOUNTER — E-VISIT (OUTPATIENT)
Dept: FAMILY MEDICINE | Facility: CLINIC | Age: 30
End: 2020-11-09
Payer: COMMERCIAL

## 2020-11-09 DIAGNOSIS — Z53.9 ERRONEOUS ENCOUNTER--DISREGARD: Primary | ICD-10-CM

## 2020-11-09 ASSESSMENT — PATIENT HEALTH QUESTIONNAIRE - PHQ9
SUM OF ALL RESPONSES TO PHQ QUESTIONS 1-9: 10
10. IF YOU CHECKED OFF ANY PROBLEMS, HOW DIFFICULT HAVE THESE PROBLEMS MADE IT FOR YOU TO DO YOUR WORK, TAKE CARE OF THINGS AT HOME, OR GET ALONG WITH OTHER PEOPLE: SOMEWHAT DIFFICULT
SUM OF ALL RESPONSES TO PHQ QUESTIONS 1-9: 10

## 2020-11-09 ASSESSMENT — ANXIETY QUESTIONNAIRES
7. FEELING AFRAID AS IF SOMETHING AWFUL MIGHT HAPPEN: SEVERAL DAYS
6. BECOMING EASILY ANNOYED OR IRRITABLE: SEVERAL DAYS
5. BEING SO RESTLESS THAT IT IS HARD TO SIT STILL: NOT AT ALL
3. WORRYING TOO MUCH ABOUT DIFFERENT THINGS: MORE THAN HALF THE DAYS
GAD7 TOTAL SCORE: 10
7. FEELING AFRAID AS IF SOMETHING AWFUL MIGHT HAPPEN: SEVERAL DAYS
4. TROUBLE RELAXING: MORE THAN HALF THE DAYS
GAD7 TOTAL SCORE: 10
GAD7 TOTAL SCORE: 10
1. FEELING NERVOUS, ANXIOUS, OR ON EDGE: MORE THAN HALF THE DAYS
2. NOT BEING ABLE TO STOP OR CONTROL WORRYING: MORE THAN HALF THE DAYS

## 2020-11-10 ASSESSMENT — PATIENT HEALTH QUESTIONNAIRE - PHQ9: SUM OF ALL RESPONSES TO PHQ QUESTIONS 1-9: 10

## 2020-11-10 ASSESSMENT — ANXIETY QUESTIONNAIRES: GAD7 TOTAL SCORE: 10

## 2020-11-21 ENCOUNTER — MYC REFILL (OUTPATIENT)
Dept: FAMILY MEDICINE | Facility: CLINIC | Age: 30
End: 2020-11-21

## 2020-11-21 DIAGNOSIS — F41.0 PANIC ATTACK: ICD-10-CM

## 2020-11-24 RX ORDER — LORAZEPAM 0.5 MG/1
0.5 TABLET ORAL EVERY 8 HOURS PRN
Qty: 30 TABLET | Refills: 0 | Status: SHIPPED | OUTPATIENT
Start: 2020-11-24 | End: 2021-04-07

## 2020-12-09 ENCOUNTER — RECORDS - HEALTHEAST (OUTPATIENT)
Dept: ADMINISTRATIVE | Facility: OTHER | Age: 30
End: 2020-12-09

## 2020-12-15 ENCOUNTER — MYC REFILL (OUTPATIENT)
Dept: DERMATOLOGY | Facility: CLINIC | Age: 30
End: 2020-12-15

## 2020-12-15 DIAGNOSIS — L70.0 ACNE VULGARIS: ICD-10-CM

## 2020-12-15 NOTE — LETTER
DA[facility] [address] [address]  Phone: [phone]  Fax: [phone]    December 17, 2020      Nicolle Salas                                                                                                       [address] [place] [id number]            Dear Ms. Salas,    Many medications require routine follow-up with your Doctor.      At this time we ask that: You schedule a routine office visit with Gina Araiza PA-C in Dermatology.    Your prescription, tazarotene: Has been refilled for 1 month so you may have time for the above noted follow-up.      Thank you,    [facility] Dermatology

## 2020-12-17 RX ORDER — TAZAROTENE 0.05 MG/G
CREAM CUTANEOUS
Qty: 60 G | Refills: 0 | Status: SHIPPED | OUTPATIENT
Start: 2020-12-17

## 2020-12-17 NOTE — TELEPHONE ENCOUNTER
[employer] message sent:    Dear Ms. Salas,    Many medications require routine follow-up with your Doctor.      At this time we ask that: You schedule a routine office visit with Gina Araiza PA-C in Dermatology.    Your prescription, tazarotene: Has been refilled for 1 month so you may have time for the above noted follow-up.      Thank you,    [facility] Dermatology

## 2020-12-17 NOTE — TELEPHONE ENCOUNTER
Snail mail letter sent:    Dear Ms. Salas,    Many medications require routine follow-up with your Doctor.      At this time we ask that: You schedule a routine office visit with Gina Araiza PA-C in Dermatology.    Your prescription, tazarotene: Has been refilled for 1 month so you may have time for the above noted follow-up.      Thank you,    [facility] Dermatology

## 2020-12-20 ENCOUNTER — HEALTH MAINTENANCE LETTER (OUTPATIENT)
Age: 30
End: 2020-12-20

## 2021-01-06 ENCOUNTER — MYC REFILL (OUTPATIENT)
Dept: FAMILY MEDICINE | Facility: CLINIC | Age: 31
End: 2021-01-06

## 2021-01-06 DIAGNOSIS — F43.23 ADJUSTMENT DISORDER WITH MIXED ANXIETY AND DEPRESSED MOOD: ICD-10-CM

## 2021-01-08 RX ORDER — PAROXETINE 20 MG/1
30 TABLET, FILM COATED ORAL EVERY MORNING
Qty: 135 TABLET | Refills: 0 | Status: SHIPPED | OUTPATIENT
Start: 2021-01-08 | End: 2021-03-24

## 2021-01-08 NOTE — TELEPHONE ENCOUNTER
Advised due for pcv and pap-Sent phq 9 and Rico via [employer]. Nalini Pedraza RN

## 2021-04-07 ENCOUNTER — VIRTUAL VISIT (OUTPATIENT)
Dept: FAMILY MEDICINE | Facility: CLINIC | Age: 31
End: 2021-04-07
Payer: COMMERCIAL

## 2021-04-07 DIAGNOSIS — G47.00 INSOMNIA, UNSPECIFIED TYPE: Primary | ICD-10-CM

## 2021-04-07 DIAGNOSIS — F41.0 PANIC ATTACK: ICD-10-CM

## 2021-04-07 DIAGNOSIS — F43.23 ADJUSTMENT DISORDER WITH MIXED ANXIETY AND DEPRESSED MOOD: ICD-10-CM

## 2021-04-07 PROCEDURE — 96127 BRIEF EMOTIONAL/BEHAV ASSMT: CPT | Mod: 59 | Performed by: NURSE PRACTITIONER

## 2021-04-07 PROCEDURE — 99214 OFFICE O/P EST MOD 30 MIN: CPT | Mod: GT | Performed by: NURSE PRACTITIONER

## 2021-04-07 RX ORDER — PAROXETINE 20 MG/1
30 TABLET, FILM COATED ORAL EVERY MORNING
Qty: 135 TABLET | Refills: 1 | Status: SHIPPED | OUTPATIENT
Start: 2021-04-07 | End: 2021-11-29

## 2021-04-07 RX ORDER — LORAZEPAM 0.5 MG/1
0.5 TABLET ORAL EVERY 8 HOURS PRN
Qty: 15 TABLET | Refills: 0 | Status: SHIPPED | OUTPATIENT
Start: 2021-04-07 | End: 2021-07-15

## 2021-04-07 RX ORDER — HYDROXYZINE HYDROCHLORIDE 25 MG/1
25 TABLET, FILM COATED ORAL 3 TIMES DAILY PRN
Qty: 90 TABLET | Refills: 0 | Status: SHIPPED | OUTPATIENT
Start: 2021-04-07 | End: 2021-08-12

## 2021-04-07 ASSESSMENT — ANXIETY QUESTIONNAIRES
GAD7 TOTAL SCORE: 5
7. FEELING AFRAID AS IF SOMETHING AWFUL MIGHT HAPPEN: SEVERAL DAYS
3. WORRYING TOO MUCH ABOUT DIFFERENT THINGS: SEVERAL DAYS
1. FEELING NERVOUS, ANXIOUS, OR ON EDGE: SEVERAL DAYS
5. BEING SO RESTLESS THAT IT IS HARD TO SIT STILL: NOT AT ALL
7. FEELING AFRAID AS IF SOMETHING AWFUL MIGHT HAPPEN: SEVERAL DAYS
GAD7 TOTAL SCORE: 5
6. BECOMING EASILY ANNOYED OR IRRITABLE: NOT AT ALL
4. TROUBLE RELAXING: SEVERAL DAYS
2. NOT BEING ABLE TO STOP OR CONTROL WORRYING: SEVERAL DAYS

## 2021-04-07 ASSESSMENT — PATIENT HEALTH QUESTIONNAIRE - PHQ9
SUM OF ALL RESPONSES TO PHQ QUESTIONS 1-9: 6
10. IF YOU CHECKED OFF ANY PROBLEMS, HOW DIFFICULT HAVE THESE PROBLEMS MADE IT FOR YOU TO DO YOUR WORK, TAKE CARE OF THINGS AT HOME, OR GET ALONG WITH OTHER PEOPLE: SOMEWHAT DIFFICULT
SUM OF ALL RESPONSES TO PHQ QUESTIONS 1-9: 6

## 2021-04-07 NOTE — PROGRESS NOTES
Nicolle is a 30 year old who is being evaluated via a billable video visit.      How would you like to obtain your AVS? MyChart  If the video visit is dropped, the invitation should be resent by: Send to e-mail at: [email or web address]  Will anyone else be joining your video visit? No  Video Start Time: 9:43 AM    Assessment & Plan     Panic attack  Decrease ativan use - reserve this ONLY for panic.   Do not use for sleep.   - LORazepam (ATIVAN) 0.5 MG tablet; Take 1 tablet (0.5 mg) by mouth every 8 hours as needed for anxiety    Adjustment disorder with mixed anxiety and depressed mood  Depression/anxiety stable/omproved on paxil at 30 mg.   Keep this dose  F/u in 3 months or sooner if worsening  Increase exercise.    - PARoxetine (PAXIL) 20 MG tablet; Take 1.5 tablets (30 mg) by mouth every morning    Insomnia, unspecified type  Start atarax for sleep.   Continue to trial benadryl/melatonin or z quil.  If ineffective use atarax.    F/u in 3 months or sooner if worsening.    - hydrOXYzine (ATARAX) 25 MG tablet; Take 1 tablet (25 mg) by mouth 3 times daily as needed (insomnia)  No follow-ups on file.    JIN Gordon CNP  [facility]    Subjective   Nicolle is a 30 year old who presents for the following health issues     HPI     Medication Followup of Paxil and Lorazepam    Taking Medication as prescribed: yes    Side Effects:  None    Medication Helping Symptoms:  Yes    Patient would like to increase her paxil dosage.        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   Dad's melanoma PET scan was good.    Occasionally not sleeping well.     Usually tries melatonin, and zquil for sleep.    Tried trazodone in the past (at [facility]) and got dizzy with vision changes.    using ativan for sleep when it is bad.     Rare panic.    paxil working well, would like to feel even better but it is helping.       Answers for HPI/ROS submitted by the patient on 4/7/2021   SIXTO 7 TOTAL SCORE: 5  If you checked off any problems, how difficult have these problems made it for you to do your work, take care of things at home, or get along with other people?: Somewhat difficult  PHQ9 TOTAL SCORE: 6    Improved since December  Currently on 30 mg daily        Review of Systems   Constitutional, HEENT, cardiovascular, pulmonary, GI, , musculoskeletal, neuro, skin, endocrine and psych systems are negative, except as otherwise noted.      Objective           Vitals:  No vitals were obtained today due to virtual visit.    Physical Exam   GENERAL: Healthy, alert and no distress  EYES: Eyes grossly normal to inspection.  No discharge or erythema, or obvious scleral/conjunctival abnormalities.  RESP: No audible wheeze, cough, or visible cyanosis.  No visible retractions or increased work of breathing.    SKIN: Visible skin clear. No significant rash, abnormal pigmentation or lesions.  NEURO: Cranial nerves grossly intact.  Mentation and speech appropriate for age.  PSYCH: Mentation appears normal, affect normal/bright, judgement and insight intact, normal speech and appearance well-groomed.            Video-Visit Details    Type of service:  Video Visit    Video End Time:950am    Originating Location (pt. Location): Home    Distant Location (provider location):  [facility]     Platform used for Video Visit: [employer]

## 2021-04-08 ASSESSMENT — PATIENT HEALTH QUESTIONNAIRE - PHQ9: SUM OF ALL RESPONSES TO PHQ QUESTIONS 1-9: 6

## 2021-04-08 ASSESSMENT — ANXIETY QUESTIONNAIRES: GAD7 TOTAL SCORE: 5

## 2021-04-24 ENCOUNTER — HEALTH MAINTENANCE LETTER (OUTPATIENT)
Age: 31
End: 2021-04-24

## 2021-07-15 ENCOUNTER — E-VISIT (OUTPATIENT)
Dept: FAMILY MEDICINE | Facility: CLINIC | Age: 31
End: 2021-07-15
Payer: COMMERCIAL

## 2021-07-15 ENCOUNTER — MYC REFILL (OUTPATIENT)
Dept: FAMILY MEDICINE | Facility: CLINIC | Age: 31
End: 2021-07-15

## 2021-07-15 DIAGNOSIS — F41.0 PANIC ATTACK: ICD-10-CM

## 2021-07-15 DIAGNOSIS — G43.019 INTRACTABLE MIGRAINE WITHOUT AURA AND WITHOUT STATUS MIGRAINOSUS: Primary | ICD-10-CM

## 2021-07-15 PROCEDURE — 99421 OL DIG E/M SVC 5-10 MIN: CPT | Performed by: NURSE PRACTITIONER

## 2021-07-15 RX ORDER — SUMATRIPTAN 50 MG/1
50 TABLET, FILM COATED ORAL
Qty: 9 TABLET | Refills: 0 | Status: SHIPPED | OUTPATIENT
Start: 2021-07-15 | End: 2021-09-01

## 2021-07-18 RX ORDER — LORAZEPAM 0.5 MG/1
0.5 TABLET ORAL EVERY 8 HOURS PRN
Qty: 15 TABLET | Refills: 0 | Status: SHIPPED | OUTPATIENT
Start: 2021-07-18 | End: 2021-10-04

## 2021-08-10 DIAGNOSIS — G47.00 INSOMNIA, UNSPECIFIED TYPE: ICD-10-CM

## 2021-08-12 RX ORDER — HYDROXYZINE HYDROCHLORIDE 25 MG/1
TABLET, FILM COATED ORAL
Qty: 90 TABLET | Refills: 2 | Status: SHIPPED | OUTPATIENT
Start: 2021-08-12 | End: 2021-09-01

## 2021-08-12 NOTE — TELEPHONE ENCOUNTER
---Prescription approved per [facility] Refill Protocol.       Huma Nichols RN BSN     [facility]        --Last visit:  7/15/21.

## 2021-08-30 DIAGNOSIS — G47.00 INSOMNIA, UNSPECIFIED TYPE: ICD-10-CM

## 2021-08-30 DIAGNOSIS — G43.019 INTRACTABLE MIGRAINE WITHOUT AURA AND WITHOUT STATUS MIGRAINOSUS: ICD-10-CM

## 2021-09-01 RX ORDER — SUMATRIPTAN 50 MG/1
TABLET, FILM COATED ORAL
Qty: 9 TABLET | Refills: 0 | Status: SHIPPED | OUTPATIENT
Start: 2021-09-01 | End: 2021-11-12

## 2021-09-01 RX ORDER — HYDROXYZINE HYDROCHLORIDE 25 MG/1
TABLET, FILM COATED ORAL
Qty: 90 TABLET | Refills: 0 | Status: SHIPPED | OUTPATIENT
Start: 2021-09-01 | End: 2023-05-24

## 2021-10-01 DIAGNOSIS — F41.0 PANIC ATTACK: ICD-10-CM

## 2021-10-03 ENCOUNTER — HEALTH MAINTENANCE LETTER (OUTPATIENT)
Age: 31
End: 2021-10-03

## 2021-10-04 RX ORDER — LORAZEPAM 0.5 MG/1
0.5 TABLET ORAL EVERY 8 HOURS PRN
Qty: 15 TABLET | Refills: 0 | Status: SHIPPED | OUTPATIENT
Start: 2021-10-04

## 2021-11-10 DIAGNOSIS — G43.019 INTRACTABLE MIGRAINE WITHOUT AURA AND WITHOUT STATUS MIGRAINOSUS: ICD-10-CM

## 2021-11-12 RX ORDER — SUMATRIPTAN 50 MG/1
TABLET, FILM COATED ORAL
Qty: 9 TABLET | Refills: 0 | Status: SHIPPED | OUTPATIENT
Start: 2021-11-12 | End: 2021-11-26

## 2021-11-26 DIAGNOSIS — F43.23 ADJUSTMENT DISORDER WITH MIXED ANXIETY AND DEPRESSED MOOD: ICD-10-CM

## 2021-11-29 RX ORDER — PAROXETINE 20 MG/1
30 TABLET, FILM COATED ORAL EVERY MORNING
Qty: 135 TABLET | Refills: 0 | Status: SHIPPED | OUTPATIENT
Start: 2021-11-29 | End: 2022-02-16

## 2021-11-29 NOTE — TELEPHONE ENCOUNTER
Routing refill request to provider for review/approval because:  PHQ-9 score greater than 4 per [facility] protocol  PHQ-9 score:    PHQ 4/7/2021   PHQ-9 Total Score 6   Q9: Thoughts of better off dead/self-harm past 2 weeks Not at all   F/U: Thoughts of suicide or self-harm -   F/U: Safety concerns -         TRISH BartlettN RN  [facility]

## 2021-12-19 ENCOUNTER — MYC MEDICAL ADVICE (OUTPATIENT)
Dept: FAMILY MEDICINE | Facility: CLINIC | Age: 31
End: 2021-12-19
Payer: COMMERCIAL

## 2021-12-19 DIAGNOSIS — F41.0 PANIC ATTACK: ICD-10-CM

## 2021-12-19 NOTE — TELEPHONE ENCOUNTER
LP Amina message sent to patient to schedule a visit.    RAGHU Melendrez, RN  [facility]

## 2021-12-24 RX ORDER — LORAZEPAM 0.5 MG/1
TABLET ORAL
Qty: 15 TABLET | Refills: 0 | OUTPATIENT
Start: 2021-12-24

## 2021-12-24 NOTE — TELEPHONE ENCOUNTER
Pt read message on 12/20 and no visit scheduled.  Encounter closed.    Brea Thurman RN  [facility]

## 2022-02-14 DIAGNOSIS — F43.23 ADJUSTMENT DISORDER WITH MIXED ANXIETY AND DEPRESSED MOOD: ICD-10-CM

## 2022-02-16 RX ORDER — PAROXETINE 20 MG/1
TABLET, FILM COATED ORAL
Qty: 45 TABLET | Refills: 0 | Status: SHIPPED | OUTPATIENT
Start: 2022-02-16 | End: 2023-05-24

## 2022-02-16 NOTE — TELEPHONE ENCOUNTER
I have signed prescription for 30 days. Please notify and help patient to schedule a follow up appointment before further refills as her pcp left

## 2022-02-16 NOTE — TELEPHONE ENCOUNTER
Routing refill request to provider for review/approval because:   PHQ-9 score less than 5 in past 6 months    Recent (6 mo) or future (30 days) visit within the authorizing provider's specialty       PHQ-9 score:    PHQ 4/7/2021   PHQ-9 Total Score 6   Q9: Thoughts of better off dead/self-harm past 2 weeks Not at all   F/U: Thoughts of suicide or self-harm -   F/U: Safety concerns -       Last virtual appt 4/7/21 with ANGELITO Givens.      Team Coordinators -  --Please contact patient and ask to schedule an annual visit/physical and establish care with a new provider.      Tenisha Polo RN  [facility]

## 2022-02-17 NOTE — TELEPHONE ENCOUNTER
Pt called back and she is establishing care at [facility].  She thanks us for the 30 day bridge.    Brea Thurman RN  [facility]

## 2022-05-15 ENCOUNTER — HEALTH MAINTENANCE LETTER (OUTPATIENT)
Age: 32
End: 2022-05-15

## 2022-09-11 ENCOUNTER — HEALTH MAINTENANCE LETTER (OUTPATIENT)
Age: 32
End: 2022-09-11

## 2023-05-24 ENCOUNTER — OFFICE VISIT (OUTPATIENT)
Dept: PHYSICAL MEDICINE AND REHAB | Facility: CLINIC | Age: 33
End: 2023-05-24
Payer: COMMERCIAL

## 2023-05-24 VITALS — OXYGEN SATURATION: 100 % | DIASTOLIC BLOOD PRESSURE: 88 MMHG | HEART RATE: 102 BPM | SYSTOLIC BLOOD PRESSURE: 133 MMHG

## 2023-05-24 DIAGNOSIS — G56.02 CARPAL TUNNEL SYNDROME OF LEFT WRIST: Primary | ICD-10-CM

## 2023-05-24 PROCEDURE — 20526 THER INJECTION CARP TUNNEL: CPT | Mod: LT | Performed by: PHYSICAL MEDICINE & REHABILITATION

## 2023-05-24 PROCEDURE — 76942 ECHO GUIDE FOR BIOPSY: CPT | Performed by: PHYSICAL MEDICINE & REHABILITATION

## 2023-05-24 PROCEDURE — 99203 OFFICE O/P NEW LOW 30 MIN: CPT | Mod: 25 | Performed by: PHYSICAL MEDICINE & REHABILITATION

## 2023-05-24 RX ORDER — TRIAMCINOLONE ACETONIDE 40 MG/ML
40 INJECTION, SUSPENSION INTRA-ARTICULAR; INTRAMUSCULAR ONCE
Status: COMPLETED | OUTPATIENT
Start: 2023-05-24 | End: 2023-05-24

## 2023-05-24 RX ADMIN — TRIAMCINOLONE ACETONIDE 40 MG: 40 INJECTION, SUSPENSION INTRA-ARTICULAR; INTRAMUSCULAR at 16:24

## 2023-05-24 NOTE — PROGRESS NOTES
HISTORY OF PRESENT ILLNESS:  Nicolle Salas is a 32 year old female who presents with a chief complaint of left wrist pain.     Patient has a history of left carpal tunnel syndrome previously seen at [facility] orthopedics and treated with bracing and landmark guided steroid injection.  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 given the need for repetitive  use of her hands.  She has been wearing her splint again but has ongoing symptoms of pain, numbness/tingling and subjective weakness.  Other treatment has included ibuprofen, Tylenol and heat.      PRIOR INJURIES/TREATMENT:   Ice/Heat: +  Brace: CTS wrist splint/brace  Physical Therapy:   N/A      - Current Pain Medications -   Ibuprofen prn   Tylenol prn     Prior Procedures:  Date    Procedure   Improvement (%)  10/27/22 L CTS inj   +80% imp +6mos              Prior Related Surgery: None   Other (acupuncture, OMT, CMM, TENS, DME, etc.): N/A    Specialists Seen - (with most recent, available notes and clinic visits reviewed)   1. [facility] - Dr SABRINA Coon (Plastic surgery)    IMAGING - reviewed   No recent relevant imaging.     Review Of Systems:  I am responding to those symptoms which are directly relevant to the specific indication for my consultation. I recommend that the patient follow up with their primary or referring provider to pursue any other symptoms which may be of concern.     Medical History:  She  has a past medical history of Depressive disorder.     She  has a past surgical history that includes vascular surgery (01/21/2020).    Family History  Her family history includes Anxiety Disorder in her brother and mother; Depression in her brother and mother; Diabetes in her cousin and maternal grandfather; Hypertension in her  father.     Social History:  Work: RN at [employer]   Current living situation: Lives in [facility]/ family  She  reports that she has never smoked. She has never used smokeless tobacco. She reports current alcohol use. She reports that she does not use drugs.        Current Medications:   She has a current medication list which includes the following prescription(s): hydroxyzine, ibuprofen, lorazepam, methocarbamol, paragard intrauterine copper, paroxetine, sumatriptan, and tazorac.     Allergies:   No Known Allergies    PHYSICAL EXAMINATION:  /88   Pulse 102   SpO2 100%    General: Pleasant, straightforward, WDWN individual.  Mental Status: Pleasant, direct, appropriate mood and affect  Resp: breathing is unlabored without audible wheeze  Vascular: No cyanosis   Heme: No visible ecchymosis or erythema on extremities  Skin: No notable rash    Neurologic:  Strength: All major muscle groups of the bilateral upper extremities have normal and symmetric muscle strength     Musculoskeletal:  Left Wrist: Neg Tinel,  Pos Phalen, Neg Carpal compression.   No diminished lt and sup pain in digits.   Full ROM, no effusion, no synovitis, muscle activation full      ASSESSMENT:  Nicolle Salas is a pleasant 32 year old Right hand dominant female who presents with:    #. Left carpal tunnel syndrome/median mononeuropathy at the wrist.     PLAN:  - Repeat US guided Left CTS injection today (see detailed procedure note)  - Continue CTS brace  - Can consider repeat injection in the future v EMG/NCS v surgical consultation  - RTC prn. Repeating corticosteroid injections could be considered, not exceeding 3 injections into anyone area in a calender year.     Ready to learn, no apparent learning barriers.  Education provided on treatment plan according to patient's preferred learning style.  Patient verbalizes understanding.   __________________________________  Ronda Quintanilla MD  Physical Medicine & Rehabilitation

## 2023-05-24 NOTE — LETTER
5/24/2023       RE: Nicolle Salas  [address] [address]     Dear Colleague,    Thank you for referring your patient, Nicolle Salas, to the [facility] [place] at [facility]. Please see a copy of my visit note below.    HISTORY OF PRESENT ILLNESS:  Nicolle Salas is a 32 year old female who presents with a chief complaint of left wrist pain.     Patient has a history of left carpal tunnel syndrome previously seen at [facility] orthopedics and treated with bracing and landmark guided steroid injection.  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  She has been wearing her splint again but has ongoing symptoms of pain, numbness/tingling and subjective weakness.  Other treatment has included ibuprofen, Tylenol and heat.      PRIOR INJURIES/TREATMENT:   Ice/Heat: +  Brace: CTS wrist splint/brace  Physical Therapy:   N/A      - Current Pain Medications -   Ibuprofen prn   Tylenol prn     Prior Procedures:  Date    Procedure   Improvement (%)  10/27/22 L CTS inj   +80% imp +6mos              Prior Related Surgery: None   Other (acupuncture, OMT, CMM, TENS, DME, etc.): N/A    Specialists Seen - (with most recent, available notes and clinic visits reviewed)   1. [facility] - Dr SABRINA Coon (Plastic surgery)    IMAGING - reviewed   No recent relevant imaging.     Review Of Systems:  I am responding to those symptoms which are directly relevant to the specific indication for my consultation. I recommend that the patient follow up with their primary or referring provider to pursue any other symptoms which may be of concern.     Medical  History:  She  has a past medical history of Depressive disorder.     She  has a past surgical history that includes vascular surgery (01/21/2020).    Family History  Her family history includes Anxiety Disorder in her brother and mother; Depression in her brother and mother; Diabetes in her cousin and maternal grandfather; Hypertension in her father.     Social History:  Work: RN at [employer]   Current living situation: Lives in [place]/ family  She  reports that she has never smoked. She has never used smokeless tobacco. She reports current alcohol use. She reports that she does not use drugs.        Current Medications:   She has a current medication list which includes the following prescription(s): hydroxyzine, ibuprofen, lorazepam, methocarbamol, paragard intrauterine copper, paroxetine, sumatriptan, and tazorac.     Allergies:   No Known Allergies    PHYSICAL EXAMINATION:  /88   Pulse 102   SpO2 100%    General: Pleasant, straightforward, WDWN individual.  Mental Status: Pleasant, direct, appropriate mood and affect  Resp: breathing is unlabored without audible wheeze  Vascular: No cyanosis   Heme: No visible ecchymosis or erythema on extremities  Skin: No notable rash    Neurologic:  Strength: All major muscle groups of the bilateral upper extremities have normal and symmetric muscle strength     Musculoskeletal:  Left Wrist: Neg Tinel,  Pos Phalen, Neg Carpal compression.   No diminished lt and sup pain in digits.   Full ROM, no effusion, no synovitis, muscle activation full      ASSESSMENT:  Nicolle Salas is a pleasant 32 year old Right hand dominant female who presents with:    #. Left carpal tunnel syndrome/median mononeuropathy at the wrist.     PLAN:  - Repeat US guided Left CTS injection today (see detailed procedure note)  - Continue CTS brace  - Can consider repeat injection in the future v EMG/NCS v surgical consultation  - RTC prn. Repeating corticosteroid injections  could be considered, not exceeding 3 injections into anyone area in a calender year.     Ready to learn, no apparent learning barriers.  Education provided on treatment plan according to patient's preferred learning style.  Patient verbalizes understanding.   __________________________________      PROCEDURE NOTE: Carpal Tunnel Injection Under Ultrasound Guidance    PROCEDURE DATE: 5/24/2023    PATIENT NAME: Nicolle Salas  YOB: 1990    ATTENDING PHYSICIAN: Ronda Quintanilla MD  FELLOW/RESIDENT PHYSICIAN: None    PREOPERATIVE DIAGNOSIS:   Carpal Tunnel Syndrome at the wrist  POSTOPERATIVE DIAGNOSIS: same    PROCEDURE PERFORMED: LEFT Carpal Tunnel (Perineural Median Nerve) Injection Under Ultrasound Guidance    ULTRASOUND WAS USED.    INDICATIONS FOR THE PROCEDURE:  Nicolle Salas is a 32 year old female who presents with carpal tunnel syndrome.     PROCEDURE AND FINDINGS:  She was greeted in the clinic. The risk, benefits and alternatives to the procedure were again reviewed with her and informed consent was obtained and the patient agreed to proceed. A time-out was performed. Following review alternatives, benefits and risks, the procedure was carried out under sterile prep with sterile gel. The use of direct sonographic guidance was used to ensure accurate placement of the needle (rather than non-guided injection) and required to minimize the risk of bleeding or injury to nearby neurovascular structures. Images were recorded and stored.     A 18-8MHz ultrasound transducer was used to visualize the relevant structures and determine the optimal needle path for the procedure. A 27 gauge 1.5 inch needle was advanced utilizing an in-plane approach with the transducer and in short-axis with the nerve, under continuous ultrasound guidance to the perineural median nerve space. After negative aspiration, slow injection of the treatment solution 3mL total consisting of 1mL Kenalog (40mg/mL) and 2mL  of 1% Lidocaine was instilled into affected area on the left. The tip of the needle was visualized throughout the procedure. The remainder of the single-use vials were discarded.      She tolerated the procedure well, was discharged home in stable condition.     Follow-up will be as needed.     COMPLICATIONS: None    COMMENTS: None         Again, thank you for allowing me to participate in the care of your patient.      Sincerely,    Ronda Quintanilla MD

## 2023-05-24 NOTE — PROGRESS NOTES
PROCEDURE NOTE: Carpal Tunnel Injection Under Ultrasound Guidance    PROCEDURE DATE: 5/24/2023    PATIENT NAME: Nicolle Salas  YOB: 1990    ATTENDING PHYSICIAN: Ronda Quintanilla MD  FELLOW/RESIDENT PHYSICIAN: None    PREOPERATIVE DIAGNOSIS:   Carpal Tunnel Syndrome at the wrist  POSTOPERATIVE DIAGNOSIS: same    PROCEDURE PERFORMED: LEFT Carpal Tunnel (Perineural Median Nerve) Injection Under Ultrasound Guidance    ULTRASOUND WAS USED.    INDICATIONS FOR THE PROCEDURE:  Nicolle Salas is a 32 year old female who presents with carpal tunnel syndrome.     PROCEDURE AND FINDINGS:  She was greeted in the clinic. The risk, benefits and alternatives to the procedure were again reviewed with her and informed consent was obtained and the patient agreed to proceed. A time-out was performed. Following review alternatives, benefits and risks, the procedure was carried out under sterile prep with sterile gel. The use of direct sonographic guidance was used to ensure accurate placement of the needle (rather than non-guided injection) and required to minimize the risk of bleeding or injury to nearby neurovascular structures. Images were recorded and stored.     A 18-8MHz ultrasound transducer was used to visualize the relevant structures and determine the optimal needle path for the procedure. A 27 gauge 1.5 inch needle was advanced utilizing an in-plane approach with the transducer and in short-axis with the nerve, under continuous ultrasound guidance to the perineural median nerve space. After negative aspiration, slow injection of the treatment solution 3mL total consisting of 1mL Kenalog (40mg/mL) and 2mL of 1% Lidocaine was instilled into affected area on the left. The tip of the needle was visualized throughout the procedure. The remainder of the single-use vials were discarded.      She tolerated the procedure well, was discharged home in stable condition.     Follow-up will be as needed.      COMPLICATIONS: None    COMMENTS: None

## 2023-07-23 ENCOUNTER — HEALTH MAINTENANCE LETTER (OUTPATIENT)
Age: 33
End: 2023-07-23

## 2023-07-30 ENCOUNTER — HOSPITAL ENCOUNTER (OUTPATIENT)
Facility: CLINIC | Age: 33
Setting detail: OBSERVATION
Discharge: HOME | End: 2023-07-31
Attending: EMERGENCY MEDICINE | Admitting: EMERGENCY MEDICINE

## 2023-07-30 DIAGNOSIS — F41.9 ANXIETY: ICD-10-CM

## 2023-07-30 DIAGNOSIS — F33.2 SEVERE EPISODE OF RECURRENT MAJOR DEPRESSIVE DISORDER, WITHOUT PSYCHOTIC FEATURES (H): ICD-10-CM

## 2023-07-30 PROBLEM — R45.851 SUICIDAL IDEATION: Status: ACTIVE | Noted: 2023-07-30

## 2023-07-30 PROBLEM — F32.A DEPRESSION, UNSPECIFIED DEPRESSION TYPE: Status: ACTIVE | Noted: 2023-07-30

## 2023-07-30 PROCEDURE — G0378 HOSPITAL OBSERVATION PER HR: HCPCS

## 2023-07-30 PROCEDURE — 250N000013 HC RX MED GY IP 250 OP 250 PS 637: Performed by: PSYCHIATRY & NEUROLOGY

## 2023-07-30 PROCEDURE — 99285 EMERGENCY DEPT VISIT HI MDM: CPT | Mod: 25

## 2023-07-30 PROCEDURE — 90791 PSYCH DIAGNOSTIC EVALUATION: CPT

## 2023-07-30 RX ORDER — LORAZEPAM 0.5 MG/1
0.5 TABLET ORAL ONCE
Status: COMPLETED | OUTPATIENT
Start: 2023-07-30 | End: 2023-07-30

## 2023-07-30 RX ADMIN — LORAZEPAM 0.5 MG: 0.5 TABLET ORAL at 22:55

## 2023-07-30 ASSESSMENT — ACTIVITIES OF DAILY LIVING (ADL)
ADLS_ACUITY_SCORE: 35
ADLS_ACUITY_SCORE: 35

## 2023-07-31 VITALS
SYSTOLIC BLOOD PRESSURE: 96 MMHG | TEMPERATURE: 98.2 F | DIASTOLIC BLOOD PRESSURE: 60 MMHG | OXYGEN SATURATION: 100 % | RESPIRATION RATE: 16 BRPM | HEART RATE: 61 BPM | WEIGHT: 144.8 LBS | HEIGHT: 69 IN | BODY MASS INDEX: 21.45 KG/M2

## 2023-07-31 PROCEDURE — 250N000013 HC RX MED GY IP 250 OP 250 PS 637: Performed by: NURSE PRACTITIONER

## 2023-07-31 PROCEDURE — 99236 HOSP IP/OBS SAME DATE HI 85: CPT | Performed by: NURSE PRACTITIONER

## 2023-07-31 PROCEDURE — 250N000013 HC RX MED GY IP 250 OP 250 PS 637: Performed by: PSYCHIATRY & NEUROLOGY

## 2023-07-31 PROCEDURE — G0378 HOSPITAL OBSERVATION PER HR: HCPCS

## 2023-07-31 RX ORDER — BUPROPION HYDROCHLORIDE 150 MG/1
150 TABLET ORAL DAILY
Status: DISCONTINUED | OUTPATIENT
Start: 2023-07-31 | End: 2023-07-31 | Stop reason: HOSPADM

## 2023-07-31 RX ORDER — BUPROPION HYDROCHLORIDE 150 MG/1
150 TABLET ORAL EVERY MORNING
Qty: 30 TABLET | Refills: 0 | Status: SHIPPED | OUTPATIENT
Start: 2023-07-31

## 2023-07-31 RX ORDER — LORAZEPAM 0.5 MG/1
0.5 TABLET ORAL ONCE
Status: COMPLETED | OUTPATIENT
Start: 2023-07-31 | End: 2023-07-31

## 2023-07-31 RX ADMIN — BUPROPION HYDROCHLORIDE 150 MG: 150 TABLET, FILM COATED, EXTENDED RELEASE ORAL at 11:58

## 2023-07-31 RX ADMIN — LORAZEPAM 0.5 MG: 0.5 TABLET ORAL at 01:22

## 2023-07-31 RX ADMIN — VENLAFAXINE HYDROCHLORIDE 225 MG: 75 CAPSULE, EXTENDED RELEASE ORAL at 09:04

## 2023-07-31 ASSESSMENT — COLUMBIA-SUICIDE SEVERITY RATING SCALE - C-SSRS
1. SINCE LAST CONTACT, HAVE YOU WISHED YOU WERE DEAD OR WISHED YOU COULD GO TO SLEEP AND NOT WAKE UP?: NO
2. HAVE YOU ACTUALLY HAD ANY THOUGHTS OF KILLING YOURSELF?: NO
SUICIDE, SINCE LAST CONTACT: NO
ATTEMPT SINCE LAST CONTACT: NO
TOTAL  NUMBER OF ABORTED OR SELF INTERRUPTED ATTEMPTS SINCE LAST CONTACT: NO
TOTAL  NUMBER OF INTERRUPTED ATTEMPTS SINCE LAST CONTACT: NO
6. HAVE YOU EVER DONE ANYTHING, STARTED TO DO ANYTHING, OR PREPARED TO DO ANYTHING TO END YOUR LIFE?: NO

## 2023-07-31 ASSESSMENT — ACTIVITIES OF DAILY LIVING (ADL)
ADLS_ACUITY_SCORE: 35

## 2023-07-31 NOTE — ED NOTES
32 year old female with history of anxiety and depression received from the ED due to SI. Pt had originally told of a plan to overdose on Ativan, but now reports that she has been only having fleeting suicidal thoughts without a plan. Pt reports that she has been having marital problems, and these started to get a lot worse two days ago. Her  hasn't been coming home, and she's been arguing with her mother. Denies AH and HI. Nursing and risk assessments completed. Assessments reviewed with LMHP and physician. Admission information reviewed with patient. Patient given a tour of [facility] and instructions on using the facility. Questions regarding [facility] addressed. Pt safety search completed.

## 2023-07-31 NOTE — PROGRESS NOTES
[facility] Progress Note    Client Name: Nicolle Salas  Date: July 31, 2023  Service Type:  Group Therapy  Facilitator: KATY Moscoso          Response:  Patient did not participate in group.      KATY Moscoso

## 2023-07-31 NOTE — ED PROVIDER NOTES
"  History     Chief Complaint:  Suicidal       The history is provided by the patient.      Nicolle Salas is a 32 year old female who presents with suicidal ideation. Patient states that she had been working at [employer] for the last 10 years and her metnal health problems onset in 2020 due to a merger at work and her best friend birthing a still born baby. She expressed that it felt like everything was just piling up in her life and she lost all coping mechanisms. She sought out therapy and changed her meds, but she states that she \"can't seem to get her head fully above the water\". Patient expresses that her  does not understand mental health and is constantly saying \"all of the wrong things\". She states that she has not real plan to kill herself, but mentioned \"something about taking too many pills\" to her friend which prompted her friend calling EMS services to the patients home. She explains that she doesn't \"feel any pain when she is asleep\" and that when she's asleep \"it's like she doesn't exist\". Patient says that her and her  had a fight on Friday that stemmed from her wanting him to be more supportive of her mental health issues and resulted in him leaving the home and saying that \"he wants her out of his life\". She states that her  has not returned to the home. Patient admits that she is mostly frustrated with her  and mentioned taking pills to her friend so that she would say something to her  and he would take her seriously.She denies any cough, colds, or illnesses.     Independent Historian:   None - Patient Only        Medications:    Lorazepam   Sumatriptan   Venlafaxine   Paragard intrauterine copper    Past Medical History:    Depression   Chronic Insomnia  Anxiety  STD    Past Surgical History:    [place] teeth extraction   Vascular Surgery    Physical Exam   Patient Vitals for the past 24 hrs:   BP Temp Temp src Pulse Resp SpO2 Height Weight   07/30/23 2209 " "109/77 98.3  F (36.8  C) Oral 76 16 100 % 1.753 m (5' 9\") 65.7 kg (144 lb 12.8 oz)   07/30/23 2058 114/71 97.1  F (36.2  C) Temporal 71 16 100 % -- 63.5 kg (140 lb)        Physical Exam  Eyes:  The pupils are equal and round    Conjunctivae and sclerae are normal  ENT:    The nose is normal    Pinnae are normal  CV:  Good color  Resp:  Normal respiratory effort    Speaks in full sentences  MS:  Normal muscular tone    No asymmetric leg swelling  Skin:  No rash or acute skin lesions noted  Neuro:   Awake, alert.      Speech is normal and fluent.    Face is symmetric.     Moves all extremities  Psych: Tearful.  Endorses suicidal thoughts.  Admits that she did state that she was thinking of taking medications for suicide, but she thinks it was more of a cry for help.  She denies any plan to commit suicide.    Emergency Department Course     Emergency Department Course & Assessments:    PSS-3      Date and Time Over the past 2 weeks have you felt down, depressed, or hopeless? Over the past 2 weeks have you had thoughts of killing yourself? Have you ever attempted to kill yourself? When did this last happen? User   07/30/23 2112 yes yes yes -- ESN          C-SSRS ([place])      Date and Time Q1 Wished to be Dead (Past Month) Q2 Suicidal Thoughts (Past Month) Q3 Suicidal Thought Method Q4 Suicidal Intent without Specific Plan Q5 Suicide Intent with Specific Plan Q6 Suicide Behavior (Lifetime) Within the Past 3 Months? RETIRED: Level of Risk per Screen Screening Not Complete User   07/30/23 2213 yes no no no no no -- -- -- AEK                Suicide assessment completed by mental health (D.E.C., LCSW, etc.)    Interventions:  Medications   LORazepam (ATIVAN) tablet 0.5 mg (has no administration in time range)        Assessments:  1907 I obtained history and examined the patient as noted above.     Independent Interpretation (X-rays, CTs, rhythm strip):  None    Consultations/Discussion of Management or Tests:   "       Disposition:  The patient was transferred to [facility].     Impression & Plan    Medical Decision Making:  Nicolle Salas is a 32 year old female who presents to the emergency department with depression and suicidal ideation.  She has been dealing with symptoms of depression and anxiety for several years after undergoing several stressful situations.  She recently had a argument with her  and has not spoken with him since that time.  She texted a friend of hers today that she was thinking of overdosing on her pills.  She denies that she was actually intending to do that and thinks it was more of a cry for help.  She is brought here on a hold.  This will be continued until she can be evaluated by mental health.  She is calm and cooperative overall.  Patient transferred to [facility] unit.  She denied other concerns.    Critical Care time:  was 0 minutes for this patient excluding procedures.    Diagnosis:    ICD-10-CM    1. Depression, unspecified depression type  F32.A       2. Suicidal ideation  R45.851            Discharge Medications:  New Prescriptions    No medications on file        Scribe Disclosure:  I, Anna Mesa, am serving as a scribe at 11:13 PM on 7/30/2023 to document services personally performed by Foster Marrero MD based on my observations and the provider's statements to me.     7/30/2023   Foster Marrero MD [place], Aaron Joseph, MD  07/31/23 0103

## 2023-07-31 NOTE — ED NOTES
Bed: [facility]  Expected date: 7/30/23  Expected time: 8:40 AM  Means of arrival: Ambulance  Comments:  Daksha 3 32F suicidal

## 2023-07-31 NOTE — ED NOTES
Pt will stay overnight on observation. Pt requested home PRN Ativan be ordered, and this was given. Pt on MADHU.

## 2023-07-31 NOTE — ED NOTES
Patient denies suicidal ideation.  She feels better than when she came in.  She has decreased anxiety and depression.  She is mostly sleeping this am.  She did eat and take meds. She is visiting with peers right now.

## 2023-07-31 NOTE — PROGRESS NOTES
"Triage & Transition Services [facility]     Progress Note    Patient: Nioclle goes by \"Nicolle,\" uses she/her pronouns  Date of Service: July 31, 2023  Site of Service: [facility]  Patient was seen in-person.     Presenting problem:  Please see initial [facility] Crisis Assessment completed by MAU Medrano on 07/31/23 for complete assessment information. Notable concerns include depression and suicidal ideation.     Individuals Present: Nicolle & LUISANA Malik  Session start: 9:45 AM  Session end: 10:05 AM  Session duration in minutes: 20 minutes  CPT utilized: 45518 - Psychotherapy (with patient) - 30 (16-37*) min    Current Presentation:   Pt reports worsening anxiety and depression since 2020. 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 Her father was also diagnosed with melanoma that same year and her close friend gave birth to a stillborn. Pt tells this writer that her  is unsupportive of her mental health concerns, leading to friction in their relationship. She has not been unemployed for the past two months to prioritize her mental health. She is sleeping excessively. She slept all day on 07/28/23, leading to a significant argument with her . Her  told her to \"get out of his life\" and has since been staying at his friend's house. Pt reports that she texted her friend that she was having thoughts of overdosing on Ativan as a \"cry for help\". She also shares wanting to know if her  would be responsive to her suicidal ideation. She denies an actual plan to overdose, telling this writer that she does not know if she \"could or would\" overdose. Despite feeling like her life lacks purpose at present, she identifies wanting to live for her best friend and cats. This writer safety planned with pt around her suicidal ideation, including identifying supports that she can reach out to if feeling suicidal. Pt reports being able to reach out " to her best friend. This writer also educated pt about the use of [facility] and [facility].    Additional Collateral Information:  No additional collateral information collected at this time.    Ward Suicide Severity Rating Scale Since Last Contact: 07/31/23  Suicidal Ideation (Since Last Contact)  1. Wish to be Dead (Since Last Contact): No  2. Non-Specific Active Suicidal Thoughts (Since Last Contact): No  Suicidal Behavior (Since Last Contact)  Actual Attempt (Since Last Contact): No  Has subject engaged in non-suicidal self-injurious behavior? (Since Last Contact): No  Interrupted Attempts (Since Last Contact): No  Aborted or Self-Interrupted Attempt (Since Last Contact): No  Preparatory Acts or Behavior (Since Last Contact): No  Suicide (Since Last Contact): No     C-SSRS Risk (Since Last Contact)  Calculated C-SSRS Risk Score (Since Last Contact): No Risk Indicated     Mental Status Exam     Affect: Blunted   Appearance: Appropriate    Attention Span/Concentration: Attentive  Eye Contact: Engaged   Fund of Knowledge: Appropriate    Language /Speech Content: Fluent   Language /Speech Volume: Normal    Language /Speech Rate/Productions: Normal    Recent Memory: Intact   Remote Memory: Intact   Mood: Anxious, Depressed, and Sad    Orientation to Person: Yes    Orientation to Place: Yes   Orientation to Time of Day: Yes    Orientation to Date: Yes    Situation (Do they understand why they are here?): Yes    Psychomotor Behavior: Normal    Thought Content: Clear   Thought Form: Obsessive/Perseverative     Diagnosis:   F32.A Unspecified depressive disorder  F41.1 Generalized anxiety disorder    Therapeutic Intervention(s):   Provided active listening, unconditional positive regard, and validation. Engaged in guided discovery, explored patient's perspectives and helped expand them through socratic dialogue.    Treatment Objective(s) Addressed:   The focus of this session was on safety planning and assessing  safety.    Progress Towards Goals:   Patient reports improving symptoms. Patient is making progress towards treatment goals as evidenced by denial of a plan or intent for suicide. Pt reports wanting to live for her best friend and cats.     Case Management:   No case management provided today.    Plan and Clinical Summary and Substantiation of Recommendations:   Discharge: It is the recommendation of this writer that pt discharge from the hospital to follow-up with outpatient supports, including psychiatry and therapy. Pt denies a plan or intent for suicide at this point in time. She reports wanting to live with her best friend and cats.    Attending concurs with disposition: Yes         LUISANA Malik     Aftercare Plan    Follow up with established providers and supports as scheduled. Continue taking medications as prescribed. Abstain from drugs and alcohol. Utilize your [facility] mental [facility] crisis team as needed. They are available 24/7. Contact information is listed below.     If I am feeling unsafe or I am in a crisis, I will:   Contact my established care providers   Call the National Suicide Prevention Lifeline: 988  Contact [facility]: [phone]  Call 911  Go to the nearest emergency room     Warning signs that I or other people might notice when a crisis is developing for me: changes to sleep, appetite or mood, increased anger, agitation or irritability, feeling depressed or hopeless, spending more time alone or talking less, increased crying, decreased productivity, seeing or hearing things that aren't there, thoughts of not wanting to live anymore or of actually killing myself, thoughts of hurting others    Things I am able to do on my own to cope or help me feel better: watching a favorite tv show or movie, listening to music I enjoy, going outside and breathing fresh air, going for a walk or exercising, taking a shower or bath, a cold or hot beverage, a healthy snack,  drawing/coloring/painting, journaling, singing or dancing, deep breathing     I can try practicing square breathing when I begin to feel anxious - inhale through the nose for the count of 4 and the first line on the square. Exhale through the mouth for the count of 4 for the second line of the square. Repeat to complete the square. Repeat the square as many times as needed.    I can also use my five senses to practice mindfulness and grounding. What are five things I can see, four things I can hear, three things I can feel, two things I can smell, and one thing I can taste.     Things that I am able to do with others to cope or help me feel better: sometimes just talking or spending time with someone else, sharing a meal or having coffee, watching a movie or playing a game, going for a walk or exercising    I can also use community resources including mental health hotlines, [facility] crisis teams, or apps.     Things I can use or do for distraction: movies/tv, music, reading, games, drawing/coloring/painting or other art, essential oils, exercise, cleaning/organizing, puzzles, crossword puzzles, word search, Sudoku       I can also download a meditation or relaxation bud, like Calm, Headspace, or Insight Timer (all three offer a free version)    Changes I can make to support my mental health and wellness: Attend scheduled mental health therapy and psychiatric appointments. Take my medications as prescribed. Maintain a daily schedule/routine. Abstain from all mood altering substances, including drugs, alcohol, or medications not currently prescribed to me. Implement a self-care routine.      People in my life that I can ask for help: friends or family, trusted teachers/staff/colleagues, trusted members of my community or place of [employer]uc[employer], mental health crisis lines, or 911    Your [facility] has a mental health crisis team you can call 24/7: [facility] Adult, [phone]    Other things that are important when I m in  "crisis: to remember that the feelings I am having right now are temporary, and it won't feel like this forever, and that it is okay and important to ask for help    [facility]      Crisis Lines  Crisis Text Line  Text 969448  You will be connected with a trained live crisis counselor to provide support.    Por deny, texto  FRANKI a 328720 o texto a 442-AYUDAME en WhatsApp    National Hope Line  1.800.SUICIDE [3835512]      Community Resources  Fast Tracker  Linking people to mental health and substance use disorder resources  [employer].IV Diagnostics     [place] Mental Health Warm Line  Peer to peer support  Monday thru Saturday, 12 pm to 10 pm  659.356.5765 or 9.784.556.6658  Text \"Support\" to 00901    National [employer]es on Mental Illness (ASHLEIGH)  276.589.8753 or 1.888.ASHLEIGH.HELPS      Mental Health Apps  My3  https://[employer].IV Diagnostics/    VirtualHopeBox  https://[employer]/apps/virtual-hope-box/      Additional Information  Today you were seen by a licensed mental health professional through Triage and Transition services, [facility] ([facility])  for a crisis assessment in the Emergency Department at [facility].  It is recommended that you follow up with your established providers (psychiatrist, mental health therapist, and/or primary care doctor - as relevant) as soon as possible. Coordinators from [facility] will be calling you in the next 24-48 hours to ensure that you have the resources you need.  You can also contact [facility] coordinators directly at [phone]. You may have been scheduled for or offered an appointment with a mental health provider. [facility] maintains an extensive network of licensed behavioral health providers to connect patients with the services they need.  We do not charge providers a fee to participate in our referral network.  We match patients with providers based on a patient's specific needs, insurance coverage, and location.  Our first effort will be to " refer you to a provider within your care system, and will utilize providers outside your care system as needed.

## 2023-07-31 NOTE — ED NOTES
Discharge instructions reviewed with patient including follow-up care plan. Educated on medication regime and advised not to stop prescribed medication without consulting their physician. Reviewed safety plan and outpatient resources/appointments.Verbalized understanding of discharge instructions. Denies SI. All belongings which where brought into the hospital have been returned to patient. Escorted off the unit @  5285 with staff.    Discharged to home.

## 2023-07-31 NOTE — DISCHARGE INSTRUCTIONS
Aftercare Plan    Follow up with established providers and supports as scheduled. Continue taking medications as prescribed. Abstain from drugs and alcohol. Utilize your [facility] crisis team as needed. They are available 24/7. Contact information is listed below.     If I am feeling unsafe or I am in a crisis, I will:   Contact my established care providers   Call the National Suicide Prevention Lifeline: 988  Contact [facility] COPE: [phone]  Call 911  Go to the nearest emergency room     Warning signs that I or other people might notice when a crisis is developing for me: changes to sleep, appetite or mood, increased anger, agitation or irritability, feeling depressed or hopeless, spending more time alone or talking less, increased crying, decreased productivity, seeing or hearing things that aren't there, thoughts of not wanting to live anymore or of actually killing myself, thoughts of hurting others    Things I am able to do on my own to cope or help me feel better: watching a favorite tv show or movie, listening to music I enjoy, going outside and breathing fresh air, going for a walk or exercising, taking a shower or bath, a cold or hot beverage, a healthy snack, drawing/coloring/painting, journaling, singing or dancing, deep breathing     I can try practicing square breathing when I begin to feel anxious - inhale through the nose for the count of 4 and the first line on the square. Exhale through the mouth for the count of 4 for the second line of the square. Repeat to complete the square. Repeat the square as many times as needed.    I can also use my five senses to practice mindfulness and grounding. What are five things I can see, four things I can hear, three things I can feel, two things I can smell, and one thing I can taste.     Things that I am able to do with others to cope or help me feel better: sometimes just talking or spending time with someone else, sharing a meal or  "having coffee, watching a movie or playing a game, going for a walk or exercising    I can also use community resources including mental health hotlines, [facility] crisis teams, or apps.     Things I can use or do for distraction: movies/tv, music, reading, games, drawing/coloring/painting or other art, essential oils, exercise, cleaning/organizing, puzzles, crossword puzzles, word search, Sudoku       I can also download a meditation or relaxation bud, like Calm, Headspace, or Insight Timer (all three offer a free version)    Changes I can make to support my mental health and wellness: Attend scheduled mental health therapy and psychiatric appointments. Take my medications as prescribed. Maintain a daily schedule/routine. Abstain from all mood altering substances, including drugs, alcohol, or medications not currently prescribed to me. Implement a self-care routine.      People in my life that I can ask for help: friends or family, trusted teachers/staff/colleagues, trusted members of my community or place of [employer], mental health crisis lines, or 911    Your [facility] has a mental health crisis team you can call 24/7: [facility], [phone]    Other things that are important when I m in crisis: to remember that the feelings I am having right now are temporary, and it won't feel like this forever, and that it is okay and important to ask for help    [facility]      Crisis Lines  Crisis Text Line  Text 476680  You will be connected with a trained live crisis counselor to provide support.    Por espanol, texto  FRANKI a 305535 o texto a 442-AYUDAME en [facility] Line  1.800.SUICIDE [6196012]      Community Resources  Fast Tracker  Linking people to mental health and substance use disorder resources  fasttrackermn.org     [place] Mental Health Warm Line  Peer to peer support  Monday thru Saturday, 12 pm to 10 pm  770.827.8549 or 1.229.249.2428  Text \"Support\" to " 34968    National [place] on Mental Illness (ASHLEIGH)  504.652.8472 or 1.888.ASHLEIGH.HELPS      Mental Health Apps  My3  https://myArrowsightpp.org/    VirtualHopeBox  https://[employer]/apps/virtual-hope-box/      Additional Information  Today you were seen by a licensed mental health professional through Triage and Transition services, [facility] (P)  for a crisis assessment in the Emergency Department at [facility].  It is recommended that you follow up with your established providers (psychiatrist, mental health therapist, and/or primary care doctor - as relevant) as soon as possible. Coordinators from [facility] will be calling you in the next 24-48 hours to ensure that you have the resources you need.  You can also contact [facility] coordinators directly at [phone]. You may have been scheduled for or offered an appointment with a mental health provider. [facility] maintains an extensive network of licensed behavioral health providers to connect patients with the services they need.  We do not charge providers a fee to participate in our referral network.  We match patients with providers based on a patient's specific needs, insurance coverage, and location.  Our first effort will be to refer you to a provider within your care system, and will utilize providers outside your care system as needed.

## 2023-07-31 NOTE — CONSULTS
"Diagnostic Evaluation Consultation  Crisis Assessment    Patient Name: Nicolle Salas  Age:  32 year old  Legal Sex: female  Gender Identity: female  Pronouns:   Race: White  Ethnicity: Not  or   Language: English      Patient was assessed: In person      Patient location: [facility] EMERGENCY DEPT                             [id number]    Referral Data and Chief Complaint  Nicolle Salas presents to the ED via EMS (on [facility]). Patient is presenting to the ED for the following concerns: Depression, Suicidal ideation.   Factors that make the mental health crisis life threatening or complex are:  The pt arrived via EMS due to suicidal ideation and symptoms of worsening depression. The pt reported she has been having ongoing conflict with her spouse, resulting in him leaving for the weekend and staying at a friend's house. The pt reported her depression significantly worsened throughout the weekend, leading her to texting a close friend reporting she had thoughts of overdosing on her Ativan. The pt's friend called 911. Pt's mother and friend were present in ED w/ pt. The pt reported she \"didn't want to die today\", stating, \"it was a cry for help\".    Informed Consent and Assessment Methods  Explained the crisis assessment process, including applicable information disclosures and limits to confidentiality, assessed understanding of the process, and obtained consent to proceed with the assessment.  Assessment methods included conducting a formal interview with patient, review of medical records, collaboration with medical staff, and obtaining relevant collateral information from family and community providers when available.  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 Pt denied past suicidal attempts. She reported she is not currently working, and spends most of her days \"in bed sleeping\". The pt reported her spouse is often frustrated w/ the pt's lack of motivation and isolation, which in turn, worsens her depression, per pt's report.    Brief Psychosocial History  Family:  , Children no  Support System:  Other (specify), , Parent(s) (Two cats)  Employment Status:  unemployed  Source of Income:  none  Financial Environmental Concerns:  unemployed  Current Hobbies:  interaction with pets  Barriers in Personal Life:  mental health concerns, lack of motivation    Significant Clinical History  Current Anxiety Symptoms:  anxious, excessive worry  Current Depression/Trauma:  crying or feels like crying, avoidance, sense of doom, withdrawl/isolation, negativistic, helplessness, hoplessness, excessive guilt, thoughts of death/suicide, sadness  Current Somatic Symptoms:     Current Psychosis/Thought Disturbance:     Current Eating Symptoms:     Chemical Use History:  Alcohol: Social  Last Use:: 07/22/23  Benzodiazepines: None  Last Use:: 07/29/23  Opiates: None  Cocaine: None  Marijuana: None  Other Use: None   Past diagnosis:  Depression, Anxiety Disorder  Family history:     Past treatment:  Individual therapy, Family therapy, Primary Care, Psychiatric Medication Management  Details of most recent treatment:  The pt reported she has done individual therapy, however is not currently seeing her primary therapist due to not having health insurance right now. Pt sees a medication management provider, telehealth. She and her spouse did couples counseling in the past. Has a PCP.  Other relevant history:       Collateral Information  Is there collateral information: Yes     Collateral information name, relationship, phone number:  Monet Young [phone]    What happened today: \"I just " "spoke with her  and there are some things I thought you should know, that I found out about today, too. I know that there has been some tension and conflict between Nicolle and her , but there are more details about it - I guess she has been stealing [employer]'s Aderall and then potentially selling it, because she will go and spend a bunch of money. She also has been stealing his money, by taking his credit cards and sending money to herself on Venmo from them\".     What is different about patient's functioning: \"I'm concerned about her mental, physical, and spiritual health. She may have threatened suicide because he was gone all weekend, so she didn't have his money or Aderall. I'm concerned about her lying and behavior\".     Concern about alcohol/drug use: yes (Posible aderall abuse)    What do you think the patient needs:      Has patient made comments about wanting to kill themselves/others: yes    If d/c is recommended, can they take part in safety/aftercare planning:  yes (Reported she would likely be the one to pick pt up upon discharge, rather than spouse)    Additional collateral information:  \"Her  mentioned divorce\".     Risk Assessment  [place] Suicide Severity Rating Scale Full Clinical Version:  Suicidal Ideation  Q1 Wish to be Dead (Lifetime): Yes  Q2 Non-Specific Active Suicidal Thoughts (Lifetime): Yes  3. Active Suicidal Ideation with any Methods (Not Plan) Without Intent to Act (Lifetime): Yes  Q4 Active Suicidal Ideation with Some Intent to Act, Without Specific Plan (Lifetime): Yes  Q5 Active Suicidal Ideation with Specific Plan and Intent (Lifetime): No  Q6 Suicide Behavior (Lifetime): no     Suicidal Behavior (Lifetime)  Actual Attempt (Lifetime): No  Has subject engaged in non-suicidal self-injurious behavior? (Lifetime): No  Interrupted Attempts (Lifetime): No  Aborted or Self-Interrupted Attempt (Lifetime): No  Preparatory Acts or Behavior (Lifetime): No    [place] Suicide " Severity Rating Scale Recent:   Suicidal Ideation (Recent)  Q1 Wished to be Dead (Past Month): yes  Q2 Suicidal Thoughts (Past Month): yes  Q3 Suicidal Thought Method: yes  Q4 Suicidal Intent without Specific Plan: yes  Q5 Suicide Intent with Specific Plan: no  Level of Risk per Screen: high risk  Intensity of Ideation (Recent)  Most Severe Ideation Rating (Past 1 Month): 3  Frequency (Past 1 Month): Less than once a week  Duration (Past 1 Month): Fleeting, few seconds or minutes  Controllability (Past 1 Month): Can control thoughts with little difficulty  Deterrents (Past 1 Month): Deterrents probably stopped you  Reasons for Ideation (Past 1 Month): Mostly to get attention, revenge, or a reaction from others  Suicidal Behavior (Recent)  Actual Attempt (Past 3 Months): No  Has subject engaged in non-suicidal self-injurious behavior? (Past 3 Months): No  Interrupted Attempts (Past 3 Months): No  Aborted or Self-Interrupted Attempt (Past 3 Months): No  Preparatory Acts or Behavior (Past 3 Months): Yes (Thought of gathering / obtaining medication to overdose on)  Preparatory Acts or Behavior Description (Past 3 Months): 1    Environmental or Psychosocial Events: threats to a prized relationship, unemployment/underemployment  Protective Factors: Protective Factors: strong bond to family unit, community support, or employment, responsibilities and duties to others, including pets and children, help seeking, optimistic outlook - identification of future goals (Pt noted her best friend and cats as her support.)    Does the patient have thoughts of harming others? Feels Like Hurting Others: no  Previous Attempt to Hurt Others: no  Is the patient engaging in sexually inappropriate behavior?: no    Is the patient engaging in sexually inappropriate behavior?  no        Mental Status Exam   Affect: Appropriate, Flat (Tearful)  Appearance: Appropriate  Attention Span/Concentration: Attentive  Eye Contact: Engaged    Fund of  Knowledge: Appropriate   Language /Speech Content: Fluent  Language /Speech Volume: Normal  Language /Speech Rate/Productions: Normal  Recent Memory: Intact  Remote Memory: Intact  Mood: Depressed, Sad  Orientation to Person: Yes   Orientation to Place: Yes  Orientation to Time of Day: Yes  Orientation to Date: Yes     Situation (Do they understand why they are here?): Yes  Psychomotor Behavior: Normal  Thought Content: Clear  Thought Form: Intact     Mini-Cog Assessment  Number of Words Recalled:    Clock-Drawing Test:     Three Item Recall:    Mini-Cog Total Score:       Medication  Psychotropic medications:   Medication Orders - Psychiatric (From admission, onward)      None             Current Care Team  Patient Care Team:  Robyn Givens APRN CNP as PCP - General  Robyn Givens APRN CNP as Assigned PCP  Daniel Melton as Physician Assistant  Jessica Nayak as Therapist  Ronda Quintanilla MD as Assigned Neuroscience Provider    Diagnosis  Patient Active Problem List   Diagnosis Code    Dysfunction of eustachian tube H69.80    CARDIOVASCULAR SCREENING; LDL GOAL LESS THAN 160 Z13.6    SIXTO (generalized anxiety disorder) F41.1    Suicidal ideation R45.851    Depression, unspecified depression type F32.A       Primary Problem This Admission  Active Hospital Problems    *Suicidal ideation      Depression, unspecified depression type      SIXTO (generalized anxiety disorder)        Clinical Summary and Substantiation of Recommendations   A lower level of care has been unsuccessful in treating and stabilizing patient s mental health symptoms. Patient will remain on [facility] unit under observation for continued monitoring, treatment and therapeutic intervention of mental health symptoms. Observation at [facility] could help mitigate the need for a more restrictive level of care in an inpatient setting. An LMHP will follow, working to address symptoms of depression and SI, and discussing stressors related to her  marriage and how that impacts her mental health.    Patient coping skills attempted to reduce the crisis:  Reaching out to friend for help while in crisis    Disposition  Recommended disposition: Observation (Observation on [facility])        Reviewed case and recommendations with attending provider. Attending Name: N/A - will be seen by provider in AM.       Attending concurs with disposition:  (N/A)       Patient and/or validated legal guardian concurs with disposition:   yes       Final disposition:  observation    Legal status on admission:      Assessment Details   Total duration spent on the patient case in minutes: 35 min     CPT code(s) utilized: 60117 - Psychotherapy for Crisis - 60 (30-74*) min    MAU Hastings, Psychotherapist  [facility] - Triage & Transition Services  Callback: [phone]

## 2023-07-31 NOTE — ED NOTES
Pt searched no contra band retrieved Pt changed into  Scrubs. All belongings with pt label placed  on the bag and in pt locker     Video Observation initiated, patient informed.     Marylu Curry RN

## 2023-07-31 NOTE — ED PROVIDER NOTES
[facility] Unit - Psychiatry  Combined Observation Note and Discharge Summary  [facility] Emergency Department  Observation Initiation Date: Jul 30, 2023    Nicolle Salas MRN: [id number]   Age: 32 year old YOB: 1990     History     Chief Complaint   Patient presents with    Suicidal     HPI  Nicolle Salas is a 32 year old female with a past history notable for major depressive disorder, generalized anxiety, panic attacks. Patient presented to the emergency department for evaluation of suicidal ideation after texting a friend that she had been thinking about overdosing on Ativan. Patient was medically evaluated in the emergency department and deemed stable for transfer to [facility] for further psychiatric assessment.     Here at [facility], patient noted to be calm and cooperative, no acute events overnight. Patient reports struggling with depressive symptoms over the past several years, since the COVID pandemic. She had been working as a nurse at [employer], which had been overwhelming at times. She left her job about two months ago to attempt to get a better handle on her mental health symptoms. This past Friday, patient and  had an argument which stemmed from her wanting him to be more supportive of her mental health issues. He ended up leaving the house and has been staying with a friend. Patient ended up texting a friend that she was thinking about overdosing on her Ativan. Patient reports her intent in making this comment was so that her friend would reach out to her  to gain his attention and attempt to get him to take her mental health issues more seriously. Patient denies wanting to die, denies wanting to end her life. Denies prior suicide attempts. Patient reports she has been spending a lot of time in bed recently. She tends to use sleep to cope with her depression. She experiences low energy, anhedonia, loss of motivation, hypersomnia. She has been taking venlafaxine for the  "last year and continues to experience symptoms of depression. She had been on paroxetine prior to venlafaxine, and had a short trial of sertraline. Denies other previous medication trials. She utilizes lorazepam PRN. She ultimately would like to discharge home today. She denies SI/HI. No evidence of psychosis or abdullahi.       Past Medical History  Past Medical History:   Diagnosis Date    Depressive disorder      Past Surgical History:   Procedure Laterality Date    VASCULAR SURGERY  01/21/2020    [facility]     buPROPion (WELLBUTRIN XL) 150 MG 24 hr tablet  LORazepam (ATIVAN) 0.5 MG tablet  SUMAtriptan (IMITREX) 50 MG tablet  tazarotene (TAZORAC) 0.05 % external cream  venlafaxine (EFFEXOR-ER) 225 MG 24 hr tablet  paragard intrauterine copper      Allergies   Allergen Reactions    Trazodone      Felt tunnel vision, BP dropped      Family History  Family History   Problem Relation Age of Onset    Depression Mother     Anxiety Disorder Mother     Hypertension Father     Diabetes Maternal Grandfather     Diabetes Cousin     Depression Brother     Anxiety Disorder Brother      Social History   Social History     Tobacco Use    Smoking status: Never    Smokeless tobacco: Never   Substance Use Topics    Alcohol use: Yes     Comment: occasionally    Drug use: No      Past medical history, past surgical history, medications, allergies, family history, and social history were reviewed with the patient. No additional pertinent items.       Review of Systems  A medically appropriate review of systems was performed with pertinent positives and negatives noted in the HPI, and all other systems negative.    Physical Examination   BP: 114/71  Pulse: 71  Temp: 97.1  F (36.2  C)  Resp: 16  Height: 175.3 cm (5' 9\")  Weight: 63.5 kg (140 lb)  SpO2: 100 %    Physical Exam  General: Appears stated age.   Neuro: Alert and fully oriented. Extremities appear to demonstrate normal strength on visual inspection. "   Integumentary/Skin: no rash visualized, normal color    Psychiatric Examination   Appearance: awake, alert, adequately groomed, dressed in hospital scrubs, and appeared as age stated  Attitude:  cooperative  Eye Contact:  good  Mood:  depressed  Affect:  mood congruent and intensity is blunted  Speech:  clear, coherent and normal prosody  Psychomotor Behavior:  no evidence of tardive dyskinesia, dystonia, or tics  Thought Process:  linear and goal oriented  Associations:  no loose associations  Thought Content:  no evidence of suicidal ideation or homicidal ideation and no evidence of psychotic thought  Insight:  good  Judgement:  intact  Oriented to:  time, person, and place  Attention Span and Concentration:  intact  Recent and Remote Memory:  intact  Language: able to name/identify objects without impairment  Fund of Knowledge: intact with awareness of current and past events    ED Course        Labs Ordered and Resulted from Time of ED Arrival to Time of ED Departure - No data to display    Assessments & Plan (with Medical Decision Making)   Patient presenting with symptoms of depression and marital conflict, leading to her making a statement to a friend about overdosing on medication. Pt reports she was intending to get support and care from her , but family and friends were concerned, prompting her ED visit. She denies any suicidal thinking today. She is open to a trial of Wellbutrin to augment venlafaxine for depressive symptoms. Nursing notes reviewed noting no acute issues.     I have reviewed the assessment completed by the [facility].     During the observation period, the patient did not require medications for agitation, and did not require restraints/seclusion for patient and/or provider safety.    The patient was found to have a psychiatric condition that would benefit from an observation stay in the emergency department for further psychiatric stabilization and/or coordination of a safe  disposition. The observation plan includes serial assessments of psychiatric condition, potential administration of medications if indicated, further disposition pending the patient's psychiatric course during the monitoring period.     Preliminary diagnosis:    ICD-10-CM    1. Severe episode of recurrent major depressive disorder, without psychotic features (H)  F33.2       2. Anxiety  F41.9            Treatment Plan:  - Continue venlafaxine  mg daily for treatment of depression and anxiety  - Add Wellbutrin  mg daily to target ongoing depressive symptoms  - Continue lorazepam 0.5 mg daily prn for acute anxiety/panic attacks. Active prescription confirmed through PDMP database.   - Pt does not have active insurance. She will be provided with walk-in therapy resources. Continue to follow-up with outpatient psychiatric provider.   - Patient to be discharged home today    After the period in observation care, the patient's circumstances and mental state were safe for outpatient management. After counseling on the diagnosis, work-up, and treatment plan, the patient was discharged. Close follow-up with a psychiatrist and/or therapist was recommended and community psychiatric resources were provided. Patient is to return to the ED if any urgent or potentially life-threatening concerns.      At the time of discharge, the patient's acute suicide risk was determined to be low due to the following factors: Reduction in the intensity of mood/anxiety symptoms that preceded the admission, denial of suicidal thoughts, denies feeling helpless or helpless, not currently under the influence of alcohol or illicit substances, denies experiencing command hallucinations, no immediate access to firearms. The patient's acute risk could be higher if noncompliant with their treatment plan, medications, follow-up appointments or using illicit substances or alcohol. Protective factors include: social supports, stable  housing    --  Rinku Gaona CNP   [facility] EMERGENCY DEPT  [facility] Unit         Rinku Gaona CNP  07/31/23 2319

## 2023-07-31 NOTE — ED TRIAGE NOTES
Arrived via ems c/o S.I. 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 Patient is calm and cooperative. Patient is having a hard time to cope after covid pandemic.  wanted the patient out home. She states planning to overdose herself with ativan to get  attention.      Triage Assessment       Row Name 07/30/23 2111       Triage Assessment (Adult)    Airway WDL WDL       Respiratory WDL    Respiratory WDL WDL       Skin Circulation/Temperature WDL    Skin Circulation/Temperature WDL WDL       Cardiac WDL    Cardiac WDL WDL       Peripheral/Neurovascular WDL    Peripheral Neurovascular WDL WDL       Cognitive/Neuro/Behavioral WDL    Cognitive/Neuro/Behavioral WDL WDL

## 2024-07-28 ENCOUNTER — HOSPITAL ENCOUNTER (EMERGENCY)
Facility: CLINIC | Age: 34
Discharge: HOME | End: 2024-07-28
Attending: EMERGENCY MEDICINE | Admitting: EMERGENCY MEDICINE
Payer: COMMERCIAL

## 2024-07-28 VITALS
HEART RATE: 85 BPM | DIASTOLIC BLOOD PRESSURE: 75 MMHG | OXYGEN SATURATION: 100 % | SYSTOLIC BLOOD PRESSURE: 108 MMHG | RESPIRATION RATE: 16 BRPM | TEMPERATURE: 97.1 F

## 2024-07-28 DIAGNOSIS — M54.41 ACUTE RIGHT-SIDED LOW BACK PAIN WITH RIGHT-SIDED SCIATICA: ICD-10-CM

## 2024-07-28 LAB
ALBUMIN SERPL BCG-MCNC: 4.4 G/DL (ref 3.5–5.2)
ALP SERPL-CCNC: 57 U/L (ref 40–150)
ALT SERPL W P-5'-P-CCNC: 13 U/L (ref 0–50)
ANION GAP SERPL CALCULATED.3IONS-SCNC: 8 MMOL/L (ref 7–15)
AST SERPL W P-5'-P-CCNC: 18 U/L (ref 0–45)
BASOPHILS # BLD AUTO: 0 10E3/UL (ref 0–0.2)
BASOPHILS NFR BLD AUTO: 0 %
BILIRUB SERPL-MCNC: 0.4 MG/DL
BUN SERPL-MCNC: 10 MG/DL (ref 6–20)
CALCIUM SERPL-MCNC: 9.1 MG/DL (ref 8.8–10.4)
CHLORIDE SERPL-SCNC: 101 MMOL/L (ref 98–107)
CREAT SERPL-MCNC: 0.69 MG/DL (ref 0.51–0.95)
EGFRCR SERPLBLD CKD-EPI 2021: >90 ML/MIN/1.73M2
EOSINOPHIL # BLD AUTO: 0 10E3/UL (ref 0–0.7)
EOSINOPHIL NFR BLD AUTO: 0 %
ERYTHROCYTE [DISTWIDTH] IN BLOOD BY AUTOMATED COUNT: 14.5 % (ref 10–15)
GLUCOSE SERPL-MCNC: 94 MG/DL (ref 70–99)
HCO3 SERPL-SCNC: 28 MMOL/L (ref 22–29)
HCT VFR BLD AUTO: 37 % (ref 35–47)
HGB BLD-MCNC: 11.3 G/DL (ref 11.7–15.7)
HOLD SPECIMEN: NORMAL
IMM GRANULOCYTES # BLD: 0 10E3/UL
IMM GRANULOCYTES NFR BLD: 0 %
LYMPHOCYTES # BLD AUTO: 1.4 10E3/UL (ref 0.8–5.3)
LYMPHOCYTES NFR BLD AUTO: 15 %
MCH RBC QN AUTO: 26.2 PG (ref 26.5–33)
MCHC RBC AUTO-ENTMCNC: 30.5 G/DL (ref 31.5–36.5)
MCV RBC AUTO: 86 FL (ref 78–100)
MONOCYTES # BLD AUTO: 0.6 10E3/UL (ref 0–1.3)
MONOCYTES NFR BLD AUTO: 6 %
NEUTROPHILS # BLD AUTO: 7.6 10E3/UL (ref 1.6–8.3)
NEUTROPHILS NFR BLD AUTO: 79 %
NRBC # BLD AUTO: 0 10E3/UL
NRBC BLD AUTO-RTO: 0 /100
PLATELET # BLD AUTO: 243 10E3/UL (ref 150–450)
POTASSIUM SERPL-SCNC: 3.9 MMOL/L (ref 3.4–5.3)
PROT SERPL-MCNC: 6.7 G/DL (ref 6.4–8.3)
RBC # BLD AUTO: 4.31 10E6/UL (ref 3.8–5.2)
SODIUM SERPL-SCNC: 137 MMOL/L (ref 135–145)
WBC # BLD AUTO: 9.7 10E3/UL (ref 4–11)

## 2024-07-28 PROCEDURE — 80053 COMPREHEN METABOLIC PANEL: CPT | Performed by: EMERGENCY MEDICINE

## 2024-07-28 PROCEDURE — 36415 COLL VENOUS BLD VENIPUNCTURE: CPT | Performed by: EMERGENCY MEDICINE

## 2024-07-28 PROCEDURE — 250N000013 HC RX MED GY IP 250 OP 250 PS 637: Performed by: EMERGENCY MEDICINE

## 2024-07-28 PROCEDURE — 99284 EMERGENCY DEPT VISIT MOD MDM: CPT | Performed by: EMERGENCY MEDICINE

## 2024-07-28 PROCEDURE — 85025 COMPLETE CBC W/AUTO DIFF WBC: CPT | Performed by: EMERGENCY MEDICINE

## 2024-07-28 RX ORDER — CYCLOBENZAPRINE HCL 10 MG
10 TABLET ORAL ONCE
Status: COMPLETED | OUTPATIENT
Start: 2024-07-28 | End: 2024-07-28

## 2024-07-28 RX ORDER — METHYLPREDNISOLONE 4 MG
TABLET, DOSE PACK ORAL
Qty: 21 TABLET | Refills: 0 | Status: ON HOLD | OUTPATIENT
Start: 2024-07-28 | End: 2024-08-16

## 2024-07-28 RX ORDER — HYDROCODONE BITARTRATE AND ACETAMINOPHEN 5; 325 MG/1; MG/1
1 TABLET ORAL EVERY 6 HOURS PRN
Qty: 9 TABLET | Refills: 0 | Status: SHIPPED | OUTPATIENT
Start: 2024-07-28 | End: 2024-07-31

## 2024-07-28 RX ORDER — HYDROCODONE BITARTRATE AND ACETAMINOPHEN 5; 325 MG/1; MG/1
1 TABLET ORAL ONCE
Status: COMPLETED | OUTPATIENT
Start: 2024-07-28 | End: 2024-07-28

## 2024-07-28 RX ORDER — LIDOCAINE 4 G/G
1 PATCH TOPICAL ONCE
Status: DISCONTINUED | OUTPATIENT
Start: 2024-07-28 | End: 2024-07-28 | Stop reason: HOSPADM

## 2024-07-28 RX ORDER — ACETAMINOPHEN 325 MG/1
975 TABLET ORAL ONCE
Status: COMPLETED | OUTPATIENT
Start: 2024-07-28 | End: 2024-07-28

## 2024-07-28 RX ORDER — CYCLOBENZAPRINE HCL 10 MG
10 TABLET ORAL 3 TIMES DAILY PRN
Qty: 20 TABLET | Refills: 0 | Status: SHIPPED | OUTPATIENT
Start: 2024-07-28 | End: 2024-08-03

## 2024-07-28 RX ADMIN — CYCLOBENZAPRINE 10 MG: 10 TABLET, FILM COATED ORAL at 14:02

## 2024-07-28 RX ADMIN — HYDROCODONE BITARTRATE AND ACETAMINOPHEN 1 TABLET: 5; 325 TABLET ORAL at 16:29

## 2024-07-28 RX ADMIN — ACETAMINOPHEN 975 MG: 325 TABLET, FILM COATED ORAL at 14:02

## 2024-07-28 RX ADMIN — LIDOCAINE 1 PATCH: 4 PATCH TOPICAL at 14:03

## 2024-07-28 ASSESSMENT — ACTIVITIES OF DAILY LIVING (ADL)
ADLS_ACUITY_SCORE: 36
ADLS_ACUITY_SCORE: 35

## 2024-07-28 NOTE — DISCHARGE INSTRUCTIONS
Please see your PCP in 2-3 days for a recheck.  If you have increasing pain, loss of bowel or bladder function, numbness in your groin, unable to walk, fevers >101 or other acute changes, return to the ED.      Make sure you take a stool softener with narcotics.  Do not drink drive or operate heavy machinery as of this.  Caution with using Flexeril and narcotic as these can be sedating.  Use diffuse amount of products possible to help with your pain.    Discharge Instructions  Back Pain  You were seen today for back pain. Back pain can have many causes, but most will get better without surgery or other specific treatment. Sometimes there is a herniated ( slipped ) disc. We do not usually do MRI scans to look for these right away, since most herniated discs will get better on their own with time.  Today, we did not find any evidence that your back pain was caused by a serious condition. However, sometimes symptoms develop over time and cannot be found during an emergency visit, so it is very important that you follow up with your primary provider.  Generally, every Emergency Department visit should have a follow-up clinic visit with either a primary or a specialty clinic/provider. Please follow-up as instructed by your emergency provider today.    Return to the Emergency Department if:  You develop a fever with your back pain.   You have weakness or change in sensation in one or both legs.  You lose control of your bowels or bladder, or cannot empty your bladder (cannot pee).  Your pain gets much worse.     Follow-up with your provider:  Unless your pain has completely gone away, please make an appointment with your provider within one week. Most of the routine care for back pain is available in a clinic and not the Emergency Department. You may need further management of your back pain, such as more pain medication, imaging such as an X-ray or MRI, or physical therapy.    What can I do to help myself?  Remain Active  -- People are often afraid that they will hurt their back further or delay recovery by remaining active, but this is one of the best things you can do for your back. In fact, staying in bed for a long time to rest is not recommended. Studies have shown that people with low back pain recover faster when they remain active. Movement helps to bring blood flow to the muscles and relieve muscle spasms as well as preventing loss of muscle strength.  Heat -- Using a heating pad can help with low back pain during the first few weeks. Do not sleep with a heating pad, as you can be burned.   Pain medications - You may take a pain medication such as Tylenol  (acetaminophen), Advil , Motrin  (ibuprofen) or Aleve  (naproxen).  If you were given a prescription for medicine here today, be sure to read all of the information (including the package insert) that comes with your prescription.  This will include important information about the medicine, its side effects, and any warnings that you need to know about.  The pharmacist who fills the prescription can provide more information and answer questions you may have about the medicine.  If you have questions or concerns that the pharmacist cannot address, please call or return to the Emergency Department.   Remember that you can always come back to the Emergency Department if you are not able to see your regular provider in the amount of time listed above, if you get any new symptoms, or if there is anything that worries you.

## 2024-07-28 NOTE — ED PROVIDER NOTES
Emergency Department Note      History of Present Illness     Chief Complaint   Back Pain      HPI   Nicolle Salas is a 33 year old female who presents with back pain. Patient notes the onset of bilateral lower back pain since the beginning of July. She states that she has been seeing a chiropractor twice a week with associated relief. She notes exacerbated right sided back pain radiating to her right flank that started two days ago. She cannot deduce an associated trigger for her pain. She describes that changing positions is difficult and that her right leg will occasionally go numb with standing. Nicolle states that she has an ongoing menstrual cycle and has been respectively taking ibuprofen for pain with no relief. She denies a history of back issues. She denies street drug use. She denies anticoagulation. Patient denies urinary incontinence, groin numbness, dysuria, abnormal urinary output. She denies falls, trauma. She also denies chest pain, cough, fevers, runny nose.    Independent Historian   None    Review of External Notes   Reviewed virtual appointment from 7/11/2024    Past Medical History     Medical History and Problem List   Depression  Anxiety    Medications   Wellbutrin  Buspar  Lunesta  Remeron  Effexor  Ativan    Surgical History   Vascular surgery    Physical Exam     Patient Vitals for the past 24 hrs:   BP Temp Temp src Pulse Resp SpO2   07/28/24 1632 108/75 -- -- 85 16 100 %   07/28/24 1505 113/72 -- -- 92 -- --   07/28/24 1458 -- -- -- -- -- 100 %   07/28/24 1309 121/85 -- -- 62 -- 90 %   07/28/24 1245 119/53 97.1  F (36.2  C) Temporal 91 16 99 %     Physical Exam  General: Resting on the bed.  Head: No obvious trauma to head.  Ears, Nose, Throat:  External ears normal.  Nose normal.    Eyes:  Conjunctivae clear.    CV: Regular rate and rhythm.  No murmurs.      Respiratory: Effort normal and breath sounds normal.  No wheezing or crackles.   Gastrointestinal: Soft.  No distension.  There is no tenderness.    Musculoskeletal: No midline tenderness to evaluation of the thoracic or lumbar spine.  No step-off or deformity.  Mild tenderness to the right low back to palpation.  Negative straight leg raise.  No saddle anesthesia.  2+ patellar reflexes bilaterally.  Dorsi and plantarflexion 5-5.  Sensation intact to light touch to lower extremities.  2+ PT pulses bilaterally.  Neuro: Alert. Moving all extremities appropriately.  Normal speech.    Skin: Skin is warm and dry.  No rash noted.     Diagnostics     Lab Results   Labs Ordered and Resulted from Time of ED Arrival to Time of ED Departure   CBC WITH PLATELETS AND DIFFERENTIAL - Abnormal       Result Value    WBC Count 9.7      RBC Count 4.31      Hemoglobin 11.3 (*)     Hematocrit 37.0      MCV 86      MCH 26.2 (*)     MCHC 30.5 (*)     RDW 14.5      Platelet Count 243      % Neutrophils 79      % Lymphocytes 15      % Monocytes 6      % Eosinophils 0      % Basophils 0      % Immature Granulocytes 0      NRBCs per 100 WBC 0      Absolute Neutrophils 7.6      Absolute Lymphocytes 1.4      Absolute Monocytes 0.6      Absolute Eosinophils 0.0      Absolute Basophils 0.0      Absolute Immature Granulocytes 0.0      Absolute NRBCs 0.0     COMPREHENSIVE METABOLIC PANEL - Normal    Sodium 137      Potassium 3.9      Carbon Dioxide (CO2) 28      Anion Gap 8      Urea Nitrogen 10.0      Creatinine 0.69      GFR Estimate >90      Calcium 9.1      Chloride 101      Glucose 94      Alkaline Phosphatase 57      AST 18      ALT 13      Protein Total 6.7      Albumin 4.4      Bilirubin Total 0.4     ROUTINE UA WITH MICROSCOPIC REFLEX TO CULTURE   HCG QUALITATIVE URINE       Imaging   No orders to display     Independent Interpretation   None    ED Course      Medications Administered   Medications   Lidocaine (LIDOCARE) 4 % Patch 1 patch (1 patch Transdermal $Patch/Med Applied 7/28/24 1403)   acetaminophen (TYLENOL) tablet 975 mg (975 mg Oral $Given 7/28/24  1402)   cyclobenzaprine (FLEXERIL) tablet 10 mg (10 mg Oral $Given 7/28/24 1402)   HYDROcodone-acetaminophen (NORCO) 5-325 MG per tablet 1 tablet (1 tablet Oral $Given 7/28/24 1629)       Procedures   Procedures     Discussion of Management   None    ED Course   ED Course as of 07/28/24 1658   Sun Jul 28, 2024   1330 I obtained history and examined the patient as noted above     1625 I reassessed the patient.  Discussed results.  Patient feels comfortable with the plan to discharge home.       Optional/Additional Documentation  None    Medical Decision Making / Diagnosis     CMS Diagnoses: None    MIPS       None    MDM   Nicolle Salas is a 33 year old female who presents to the ER with right low back pain.  Vital signs stable.  Broad differentials considered include not limited to epidural abscess, hematoma, discitis, fracture dislocation, pinched nerve, herniated disc, musculoskeletal pain, etc.  Patient is overall well-appearing nontoxic.  She has no fall or trauma to indicate fracture or dislocation.  No signs of cauda equina, no saddle anesthesia, no other red flags to indicate emergent MRI.  No high risk features such as blood thinners, fevers, IV street drug use etc. that would necessitate an MRI to diagnose epidural abscess or hematoma etc.  Patient's pain is most consistent with musculoskeletal discomfort.  cannot rule out herniated disc but regardless plan is the same with symptomatic control.  Basic labs are reassuring.  Patient denies dysuria, frequency or chance pregnancy.  Will defer urine at this time.  Does not appear consistent with nephrolithiasis or pyelonephritis.  Given above medications with some mild improvement.  Able to tolerate laying flat.  She felt that Norco it helped her in the past and so therefore this was given today as well.  We discussed risk benefit of narcotic therapy.  Recommended do not take with her Ativan.  I did review the .  No recent narcotic prescribed.   Discussed using stool softeners with narcotics and the long-term consequences of narcotics.  Medrol Dosepak was given as well.  Recommend follow-up with primary doctor and/or orthopedics.  We discussed return precaution including worsening pain, numbness or tingling, groin numbness etc.  She voiced understanding was comfortable with plan discharge home.    Disposition   The patient was discharged.     Diagnosis     ICD-10-CM    1. Acute right-sided low back pain with right-sided sciatica  M54.41            Discharge Medications   Discharge Medication List as of 7/28/2024  4:26 PM        START taking these medications    Details   cyclobenzaprine (FLEXERIL) 10 MG tablet Take 1 tablet (10 mg) by mouth 3 times daily as needed for muscle spasms, Disp-20 tablet, R-0, E-Prescribe      HYDROcodone-acetaminophen (NORCO) 5-325 MG tablet Take 1 tablet by mouth every 6 hours as needed for severe pain, Disp-9 tablet, R-0, E-Prescribe      methylPREDNISolone (MEDROL DOSEPAK) 4 MG tablet therapy pack Follow Package Directions, Disp-21 tablet, R-0, E-Prescribe               Scribe Disclosure:  I, Nicolás Rodgers, am serving as a scribe at 1:46 PM on 7/28/2024 to document services personally performed by Dolly Haq MD based on my observations and the provider's statements to me.        Dolly Haq MD  07/28/24 [id number]

## 2024-07-28 NOTE — ED TRIAGE NOTES
Pt arrives through triage c/o R lower back pain which radiates to her posterior hip, pain began in early July, no trauma at this time, pain randomly increased in last 24 hours, pt states she visits the chiropractor twice a weeks since the pain started w/o relief, ABCD intact.       Triage Assessment (Adult)       Row Name 07/28/24 1248          Triage Assessment    Airway WDL WDL        Respiratory WDL    Respiratory WDL WDL        Skin Circulation/Temperature WDL    Skin Circulation/Temperature WDL WDL        Cardiac WDL    Cardiac WDL WDL        Peripheral/Neurovascular WDL    Peripheral Neurovascular WDL WDL        Cognitive/Neuro/Behavioral WDL    Cognitive/Neuro/Behavioral WDL WDL

## 2024-08-13 RX ORDER — HYDROCODONE BITARTRATE AND ACETAMINOPHEN 5; 325 MG/1; MG/1
1 TABLET ORAL EVERY 6 HOURS PRN
COMMUNITY

## 2024-08-13 RX ORDER — TRAMADOL HYDROCHLORIDE 50 MG/1
50 TABLET ORAL EVERY 12 HOURS PRN
COMMUNITY

## 2024-08-13 RX ORDER — IBUPROFEN 800 MG/1
800 TABLET, FILM COATED ORAL EVERY 8 HOURS PRN
COMMUNITY

## 2024-08-13 RX ORDER — ESZOPICLONE 2 MG/1
2 TABLET, FILM COATED ORAL
COMMUNITY

## 2024-08-13 RX ORDER — GABAPENTIN 300 MG/1
600 CAPSULE ORAL 3 TIMES DAILY
COMMUNITY

## 2024-08-14 RX ORDER — PHENOL 1.4 %
10 AEROSOL, SPRAY (ML) MUCOUS MEMBRANE
COMMUNITY

## 2024-08-15 ENCOUNTER — ANESTHESIA EVENT (OUTPATIENT)
Dept: SURGERY | Facility: CLINIC | Age: 34
End: 2024-08-15
Payer: COMMERCIAL

## 2024-08-16 ENCOUNTER — HOSPITAL ENCOUNTER (OUTPATIENT)
Facility: CLINIC | Age: 34
Discharge: HOME | End: 2024-08-16
Attending: STUDENT IN AN ORGANIZED HEALTH CARE EDUCATION/TRAINING PROGRAM | Admitting: STUDENT IN AN ORGANIZED HEALTH CARE EDUCATION/TRAINING PROGRAM
Payer: COMMERCIAL

## 2024-08-16 ENCOUNTER — APPOINTMENT (OUTPATIENT)
Dept: GENERAL RADIOLOGY | Facility: CLINIC | Age: 34
End: 2024-08-16
Attending: STUDENT IN AN ORGANIZED HEALTH CARE EDUCATION/TRAINING PROGRAM
Payer: COMMERCIAL

## 2024-08-16 ENCOUNTER — ANESTHESIA (OUTPATIENT)
Dept: SURGERY | Facility: CLINIC | Age: 34
End: 2024-08-16
Payer: COMMERCIAL

## 2024-08-16 VITALS
TEMPERATURE: 97.2 F | HEIGHT: 69 IN | WEIGHT: 146 LBS | SYSTOLIC BLOOD PRESSURE: 119 MMHG | RESPIRATION RATE: 16 BRPM | BODY MASS INDEX: 21.62 KG/M2 | HEART RATE: 90 BPM | DIASTOLIC BLOOD PRESSURE: 82 MMHG | OXYGEN SATURATION: 99 %

## 2024-08-16 DIAGNOSIS — Z98.890 S/P LUMBAR DISCECTOMY: Primary | ICD-10-CM

## 2024-08-16 LAB — HCG UR QL: NEGATIVE

## 2024-08-16 PROCEDURE — 258N000003 HC RX IP 258 OP 636

## 2024-08-16 PROCEDURE — 710N000009 HC RECOVERY PHASE 1, LEVEL 1, PER MIN: Performed by: STUDENT IN AN ORGANIZED HEALTH CARE EDUCATION/TRAINING PROGRAM

## 2024-08-16 PROCEDURE — 250N000009 HC RX 250

## 2024-08-16 PROCEDURE — 250N000013 HC RX MED GY IP 250 OP 250 PS 637: Performed by: STUDENT IN AN ORGANIZED HEALTH CARE EDUCATION/TRAINING PROGRAM

## 2024-08-16 PROCEDURE — 370N000017 HC ANESTHESIA TECHNICAL FEE, PER MIN: Performed by: STUDENT IN AN ORGANIZED HEALTH CARE EDUCATION/TRAINING PROGRAM

## 2024-08-16 PROCEDURE — 0275T AS PERC LAMINO-/LAMINECTOMY INDIR IMAG GUIDE LUMBAR: CPT | Performed by: STUDENT IN AN ORGANIZED HEALTH CARE EDUCATION/TRAINING PROGRAM

## 2024-08-16 PROCEDURE — 250N000011 HC RX IP 250 OP 636

## 2024-08-16 PROCEDURE — 81025 URINE PREGNANCY TEST: CPT | Performed by: STUDENT IN AN ORGANIZED HEALTH CARE EDUCATION/TRAINING PROGRAM

## 2024-08-16 PROCEDURE — 0275T AS PERC LAMINO-/LAMINECTOMY INDIR IMAG GUIDE LUMBAR: CPT

## 2024-08-16 PROCEDURE — 999N000179 XR SURGERY CARM FLUORO LESS THAN 5 MIN W STILLS

## 2024-08-16 PROCEDURE — 258N000003 HC RX IP 258 OP 636: Performed by: STUDENT IN AN ORGANIZED HEALTH CARE EDUCATION/TRAINING PROGRAM

## 2024-08-16 PROCEDURE — 999N000141 HC STATISTIC PRE-PROCEDURE NURSING ASSESSMENT: Performed by: STUDENT IN AN ORGANIZED HEALTH CARE EDUCATION/TRAINING PROGRAM

## 2024-08-16 PROCEDURE — 360N000084 HC SURGERY LEVEL 4 W/ FLUORO, PER MIN: Performed by: STUDENT IN AN ORGANIZED HEALTH CARE EDUCATION/TRAINING PROGRAM

## 2024-08-16 PROCEDURE — 250N000011 HC RX IP 250 OP 636: Performed by: STUDENT IN AN ORGANIZED HEALTH CARE EDUCATION/TRAINING PROGRAM

## 2024-08-16 PROCEDURE — 272N000001 HC OR GENERAL SUPPLY STERILE: Performed by: STUDENT IN AN ORGANIZED HEALTH CARE EDUCATION/TRAINING PROGRAM

## 2024-08-16 PROCEDURE — 250N000009 HC RX 250: Performed by: STUDENT IN AN ORGANIZED HEALTH CARE EDUCATION/TRAINING PROGRAM

## 2024-08-16 PROCEDURE — 710N000012 HC RECOVERY PHASE 2, PER MINUTE: Performed by: STUDENT IN AN ORGANIZED HEALTH CARE EDUCATION/TRAINING PROGRAM

## 2024-08-16 PROCEDURE — 250N000025 HC SEVOFLURANE, PER MIN: Performed by: STUDENT IN AN ORGANIZED HEALTH CARE EDUCATION/TRAINING PROGRAM

## 2024-08-16 RX ORDER — LIDOCAINE HYDROCHLORIDE 20 MG/ML
INJECTION, SOLUTION INFILTRATION; PERINEURAL PRN
Status: DISCONTINUED | OUTPATIENT
Start: 2024-08-16 | End: 2024-08-16

## 2024-08-16 RX ORDER — FENTANYL CITRATE 50 UG/ML
25 INJECTION, SOLUTION INTRAMUSCULAR; INTRAVENOUS EVERY 5 MIN PRN
Status: DISCONTINUED | OUTPATIENT
Start: 2024-08-16 | End: 2024-08-16 | Stop reason: HOSPADM

## 2024-08-16 RX ORDER — NALOXONE HYDROCHLORIDE 0.4 MG/ML
0.2 INJECTION, SOLUTION INTRAMUSCULAR; INTRAVENOUS; SUBCUTANEOUS
Status: DISCONTINUED | OUTPATIENT
Start: 2024-08-16 | End: 2024-08-16 | Stop reason: HOSPADM

## 2024-08-16 RX ORDER — ONDANSETRON 2 MG/ML
4 INJECTION INTRAMUSCULAR; INTRAVENOUS EVERY 6 HOURS PRN
Status: DISCONTINUED | OUTPATIENT
Start: 2024-08-16 | End: 2024-08-16 | Stop reason: HOSPADM

## 2024-08-16 RX ORDER — SODIUM CHLORIDE, SODIUM LACTATE, POTASSIUM CHLORIDE, CALCIUM CHLORIDE 600; 310; 30; 20 MG/100ML; MG/100ML; MG/100ML; MG/100ML
INJECTION, SOLUTION INTRAVENOUS CONTINUOUS
Status: DISCONTINUED | OUTPATIENT
Start: 2024-08-16 | End: 2024-08-16 | Stop reason: HOSPADM

## 2024-08-16 RX ORDER — NALOXONE HYDROCHLORIDE 0.4 MG/ML
0.1 INJECTION, SOLUTION INTRAMUSCULAR; INTRAVENOUS; SUBCUTANEOUS
Status: DISCONTINUED | OUTPATIENT
Start: 2024-08-16 | End: 2024-08-16 | Stop reason: HOSPADM

## 2024-08-16 RX ORDER — ACETAMINOPHEN 325 MG/1
975 TABLET ORAL ONCE
Status: COMPLETED | OUTPATIENT
Start: 2024-08-16 | End: 2024-08-16

## 2024-08-16 RX ORDER — SCOLOPAMINE TRANSDERMAL SYSTEM 1 MG/1
1 PATCH, EXTENDED RELEASE TRANSDERMAL ONCE
Status: DISCONTINUED | OUTPATIENT
Start: 2024-08-16 | End: 2024-08-16 | Stop reason: HOSPADM

## 2024-08-16 RX ORDER — PROCHLORPERAZINE MALEATE 10 MG
10 TABLET ORAL EVERY 6 HOURS PRN
Status: DISCONTINUED | OUTPATIENT
Start: 2024-08-16 | End: 2024-08-16 | Stop reason: HOSPADM

## 2024-08-16 RX ORDER — ACETAMINOPHEN 500 MG
500-1000 TABLET ORAL EVERY 6 HOURS PRN
COMMUNITY

## 2024-08-16 RX ORDER — LIDOCAINE 40 MG/G
CREAM TOPICAL
Status: DISCONTINUED | OUTPATIENT
Start: 2024-08-16 | End: 2024-08-16 | Stop reason: HOSPADM

## 2024-08-16 RX ORDER — NALOXONE HYDROCHLORIDE 0.4 MG/ML
0.4 INJECTION, SOLUTION INTRAMUSCULAR; INTRAVENOUS; SUBCUTANEOUS
Status: DISCONTINUED | OUTPATIENT
Start: 2024-08-16 | End: 2024-08-16 | Stop reason: HOSPADM

## 2024-08-16 RX ORDER — HYDROXYZINE HYDROCHLORIDE 10 MG/1
10 TABLET, FILM COATED ORAL EVERY 6 HOURS PRN
Qty: 30 TABLET | Refills: 0 | Status: SHIPPED | OUTPATIENT
Start: 2024-08-16

## 2024-08-16 RX ORDER — ONDANSETRON 4 MG/1
4 TABLET, ORALLY DISINTEGRATING ORAL EVERY 6 HOURS PRN
Status: DISCONTINUED | OUTPATIENT
Start: 2024-08-16 | End: 2024-08-16 | Stop reason: HOSPADM

## 2024-08-16 RX ORDER — FENTANYL CITRATE 50 UG/ML
INJECTION, SOLUTION INTRAMUSCULAR; INTRAVENOUS PRN
Status: DISCONTINUED | OUTPATIENT
Start: 2024-08-16 | End: 2024-08-16

## 2024-08-16 RX ORDER — METHYLPREDNISOLONE ACETATE 40 MG/ML
INJECTION, SUSPENSION INTRA-ARTICULAR; INTRALESIONAL; INTRAMUSCULAR; SOFT TISSUE PRN
Status: DISCONTINUED | OUTPATIENT
Start: 2024-08-16 | End: 2024-08-16 | Stop reason: HOSPADM

## 2024-08-16 RX ORDER — CEFAZOLIN SODIUM/WATER 2 G/20 ML
2 SYRINGE (ML) INTRAVENOUS SEE ADMIN INSTRUCTIONS
Status: DISCONTINUED | OUTPATIENT
Start: 2024-08-16 | End: 2024-08-16 | Stop reason: HOSPADM

## 2024-08-16 RX ORDER — GABAPENTIN 300 MG/1
300 CAPSULE ORAL
Status: COMPLETED | OUTPATIENT
Start: 2024-08-16 | End: 2024-08-16

## 2024-08-16 RX ORDER — FENTANYL CITRATE 50 UG/ML
50 INJECTION, SOLUTION INTRAMUSCULAR; INTRAVENOUS EVERY 5 MIN PRN
Status: DISCONTINUED | OUTPATIENT
Start: 2024-08-16 | End: 2024-08-16 | Stop reason: HOSPADM

## 2024-08-16 RX ORDER — DEXAMETHASONE SODIUM PHOSPHATE 4 MG/ML
INJECTION, SOLUTION INTRA-ARTICULAR; INTRALESIONAL; INTRAMUSCULAR; INTRAVENOUS; SOFT TISSUE PRN
Status: DISCONTINUED | OUTPATIENT
Start: 2024-08-16 | End: 2024-08-16

## 2024-08-16 RX ORDER — METHOCARBAMOL 750 MG/1
750 TABLET, FILM COATED ORAL EVERY 6 HOURS PRN
Qty: 60 TABLET | Refills: 0 | Status: SHIPPED | OUTPATIENT
Start: 2024-08-16

## 2024-08-16 RX ORDER — OXYCODONE HYDROCHLORIDE 5 MG/1
10 TABLET ORAL EVERY 4 HOURS PRN
Status: DISCONTINUED | OUTPATIENT
Start: 2024-08-16 | End: 2024-08-16 | Stop reason: HOSPADM

## 2024-08-16 RX ORDER — DEXAMETHASONE SODIUM PHOSPHATE 4 MG/ML
4 INJECTION, SOLUTION INTRA-ARTICULAR; INTRALESIONAL; INTRAMUSCULAR; INTRAVENOUS; SOFT TISSUE
Status: DISCONTINUED | OUTPATIENT
Start: 2024-08-16 | End: 2024-08-16 | Stop reason: HOSPADM

## 2024-08-16 RX ORDER — MAGNESIUM HYDROXIDE 1200 MG/15ML
LIQUID ORAL PRN
Status: DISCONTINUED | OUTPATIENT
Start: 2024-08-16 | End: 2024-08-16 | Stop reason: HOSPADM

## 2024-08-16 RX ORDER — ACETAMINOPHEN 325 MG/1
975 TABLET ORAL EVERY 8 HOURS
Status: DISCONTINUED | OUTPATIENT
Start: 2024-08-16 | End: 2024-08-16 | Stop reason: HOSPADM

## 2024-08-16 RX ORDER — AMOXICILLIN 250 MG
1 CAPSULE ORAL DAILY
Qty: 30 TABLET | Refills: 0 | Status: SHIPPED | OUTPATIENT
Start: 2024-08-16

## 2024-08-16 RX ORDER — ONDANSETRON 2 MG/ML
4 INJECTION INTRAMUSCULAR; INTRAVENOUS EVERY 30 MIN PRN
Status: DISCONTINUED | OUTPATIENT
Start: 2024-08-16 | End: 2024-08-16 | Stop reason: HOSPADM

## 2024-08-16 RX ORDER — OXYCODONE HYDROCHLORIDE 5 MG/1
5-10 TABLET ORAL EVERY 4 HOURS PRN
Qty: 20 TABLET | Refills: 0 | Status: SHIPPED | OUTPATIENT
Start: 2024-08-16

## 2024-08-16 RX ORDER — TRANEXAMIC ACID 10 MG/ML
1 INJECTION, SOLUTION INTRAVENOUS CONTINUOUS
Status: DISCONTINUED | OUTPATIENT
Start: 2024-08-16 | End: 2024-08-16 | Stop reason: HOSPADM

## 2024-08-16 RX ORDER — BUPIVACAINE HYDROCHLORIDE AND EPINEPHRINE 5; 5 MG/ML; UG/ML
INJECTION, SOLUTION PERINEURAL PRN
Status: DISCONTINUED | OUTPATIENT
Start: 2024-08-16 | End: 2024-08-16 | Stop reason: HOSPADM

## 2024-08-16 RX ORDER — ONDANSETRON 2 MG/ML
INJECTION INTRAMUSCULAR; INTRAVENOUS PRN
Status: DISCONTINUED | OUTPATIENT
Start: 2024-08-16 | End: 2024-08-16

## 2024-08-16 RX ORDER — PROPOFOL 10 MG/ML
INJECTION, EMULSION INTRAVENOUS PRN
Status: DISCONTINUED | OUTPATIENT
Start: 2024-08-16 | End: 2024-08-16

## 2024-08-16 RX ORDER — HYDROMORPHONE HCL IN WATER/PF 6 MG/30 ML
0.2 PATIENT CONTROLLED ANALGESIA SYRINGE INTRAVENOUS
Status: DISCONTINUED | OUTPATIENT
Start: 2024-08-16 | End: 2024-08-16 | Stop reason: HOSPADM

## 2024-08-16 RX ORDER — ONDANSETRON 4 MG/1
4 TABLET, ORALLY DISINTEGRATING ORAL EVERY 30 MIN PRN
Status: DISCONTINUED | OUTPATIENT
Start: 2024-08-16 | End: 2024-08-16 | Stop reason: HOSPADM

## 2024-08-16 RX ORDER — HYDROMORPHONE HCL IN WATER/PF 6 MG/30 ML
0.4 PATIENT CONTROLLED ANALGESIA SYRINGE INTRAVENOUS EVERY 5 MIN PRN
Status: DISCONTINUED | OUTPATIENT
Start: 2024-08-16 | End: 2024-08-16 | Stop reason: HOSPADM

## 2024-08-16 RX ORDER — OXYCODONE HYDROCHLORIDE 5 MG/1
5 TABLET ORAL EVERY 4 HOURS PRN
Status: DISCONTINUED | OUTPATIENT
Start: 2024-08-16 | End: 2024-08-16 | Stop reason: HOSPADM

## 2024-08-16 RX ORDER — SODIUM CHLORIDE, SODIUM LACTATE, POTASSIUM CHLORIDE, CALCIUM CHLORIDE 600; 310; 30; 20 MG/100ML; MG/100ML; MG/100ML; MG/100ML
INJECTION, SOLUTION INTRAVENOUS CONTINUOUS PRN
Status: DISCONTINUED | OUTPATIENT
Start: 2024-08-16 | End: 2024-08-16

## 2024-08-16 RX ORDER — CEFAZOLIN SODIUM/WATER 2 G/20 ML
2 SYRINGE (ML) INTRAVENOUS
Status: COMPLETED | OUTPATIENT
Start: 2024-08-16 | End: 2024-08-16

## 2024-08-16 RX ORDER — HYDROMORPHONE HCL IN WATER/PF 6 MG/30 ML
0.2 PATIENT CONTROLLED ANALGESIA SYRINGE INTRAVENOUS EVERY 5 MIN PRN
Status: DISCONTINUED | OUTPATIENT
Start: 2024-08-16 | End: 2024-08-16 | Stop reason: HOSPADM

## 2024-08-16 RX ORDER — ACETAMINOPHEN 325 MG/1
650 TABLET ORAL EVERY 4 HOURS PRN
Status: DISCONTINUED | OUTPATIENT
Start: 2024-08-19 | End: 2024-08-16 | Stop reason: HOSPADM

## 2024-08-16 RX ORDER — PROPOFOL 10 MG/ML
INJECTION, EMULSION INTRAVENOUS CONTINUOUS PRN
Status: DISCONTINUED | OUTPATIENT
Start: 2024-08-16 | End: 2024-08-16

## 2024-08-16 RX ORDER — HYDROMORPHONE HCL IN WATER/PF 6 MG/30 ML
0.4 PATIENT CONTROLLED ANALGESIA SYRINGE INTRAVENOUS
Status: DISCONTINUED | OUTPATIENT
Start: 2024-08-16 | End: 2024-08-16 | Stop reason: HOSPADM

## 2024-08-16 RX ADMIN — PHENYLEPHRINE HYDROCHLORIDE 100 MCG: 10 INJECTION INTRAVENOUS at 13:30

## 2024-08-16 RX ADMIN — PROPOFOL 150 MG: 10 INJECTION, EMULSION INTRAVENOUS at 12:50

## 2024-08-16 RX ADMIN — FENTANYL CITRATE 100 MCG: 50 INJECTION INTRAMUSCULAR; INTRAVENOUS at 12:50

## 2024-08-16 RX ADMIN — SCOPALAMINE 1 PATCH: 1 PATCH, EXTENDED RELEASE TRANSDERMAL at 10:56

## 2024-08-16 RX ADMIN — Medication 2 G: at 12:50

## 2024-08-16 RX ADMIN — TRANEXAMIC ACID 1 MG/KG/HR: 10 INJECTION, SOLUTION INTRAVENOUS at 13:49

## 2024-08-16 RX ADMIN — SUGAMMADEX 200 MG: 100 INJECTION, SOLUTION INTRAVENOUS at 14:27

## 2024-08-16 RX ADMIN — OXYCODONE HYDROCHLORIDE 5 MG: 5 TABLET ORAL at 16:20

## 2024-08-16 RX ADMIN — MIDAZOLAM 2 MG: 1 INJECTION INTRAMUSCULAR; INTRAVENOUS at 12:44

## 2024-08-16 RX ADMIN — ROCURONIUM BROMIDE 40 MG: 50 INJECTION, SOLUTION INTRAVENOUS at 12:50

## 2024-08-16 RX ADMIN — SODIUM CHLORIDE, POTASSIUM CHLORIDE, SODIUM LACTATE AND CALCIUM CHLORIDE: 600; 310; 30; 20 INJECTION, SOLUTION INTRAVENOUS at 12:44

## 2024-08-16 RX ADMIN — LIDOCAINE HYDROCHLORIDE 100 MG: 20 INJECTION, SOLUTION INFILTRATION; PERINEURAL at 12:50

## 2024-08-16 RX ADMIN — HYDROMORPHONE HYDROCHLORIDE 0.5 MG: 1 INJECTION, SOLUTION INTRAMUSCULAR; INTRAVENOUS; SUBCUTANEOUS at 14:16

## 2024-08-16 RX ADMIN — PROPOFOL 20 MCG/KG/MIN: 10 INJECTION, EMULSION INTRAVENOUS at 13:18

## 2024-08-16 RX ADMIN — ONDANSETRON 4 MG: 2 INJECTION INTRAMUSCULAR; INTRAVENOUS at 14:16

## 2024-08-16 RX ADMIN — TRANEXAMIC ACID 1 G: 10 INJECTION, SOLUTION INTRAVENOUS at 13:00

## 2024-08-16 RX ADMIN — ROCURONIUM BROMIDE 20 MG: 50 INJECTION, SOLUTION INTRAVENOUS at 13:17

## 2024-08-16 RX ADMIN — DEXAMETHASONE SODIUM PHOSPHATE 8 MG: 4 INJECTION, SOLUTION INTRA-ARTICULAR; INTRALESIONAL; INTRAMUSCULAR; INTRAVENOUS; SOFT TISSUE at 12:50

## 2024-08-16 RX ADMIN — ROCURONIUM BROMIDE 10 MG: 50 INJECTION, SOLUTION INTRAVENOUS at 13:52

## 2024-08-16 RX ADMIN — ACETAMINOPHEN 975 MG: 325 TABLET, FILM COATED ORAL at 10:55

## 2024-08-16 RX ADMIN — SODIUM CHLORIDE, POTASSIUM CHLORIDE, SODIUM LACTATE AND CALCIUM CHLORIDE: 600; 310; 30; 20 INJECTION, SOLUTION INTRAVENOUS at 11:11

## 2024-08-16 ASSESSMENT — ACTIVITIES OF DAILY LIVING (ADL)
ADLS_ACUITY_SCORE: 33
ADLS_ACUITY_SCORE: 35
ADLS_ACUITY_SCORE: 37

## 2024-08-16 NOTE — ANESTHESIA CARE TRANSFER NOTE
Patient: Nicolle Salas    Procedure: Procedure(s):  Right Lumbar 4 to Lumbar 5 Microdiscectomy       Diagnosis: Intervertebral disc disorders with radiculopathy, lumbar region [M51.16]  Diagnosis Additional Information: No value filed.    Anesthesia Type:   General     Note:    Oropharynx: oropharynx clear of all foreign objects and spontaneously breathing  Level of Consciousness: awake  Oxygen Supplementation: face mask  Level of Supplemental Oxygen (L/min / FiO2): 6  Independent Airway: airway patency satisfactory and stable  Dentition: dentition unchanged  Vital Signs Stable: post-procedure vital signs reviewed and stable  Report to RN Given: handoff report given  Patient transferred to: Phase II    Handoff Report: Identifed the Patient, Identified the Reponsible Provider, Reviewed the pertinent medical history, Discussed the surgical course, Reviewed Intra-OP anesthesia mangement and issues during anesthesia, Set expectations for post-procedure period and Allowed opportunity for questions and acknowledgement of understanding      Vitals:  Vitals Value Taken Time   /77 08/16/24 1439   Temp     Pulse 93 08/16/24 1441   Resp 12 08/16/24 1441   SpO2 100 % 08/16/24 1441   Vitals shown include unfiled device data.    Electronically Signed By: JIN Tejada CRNA  August 16, 2024  2:42 PM

## 2024-08-16 NOTE — DISCHARGE INSTRUCTIONS
Today you were given 975 mg of Tylenol at 10:55 AM. The recommended daily maximum dose is 4000 mg.      Information for Patients Discharging with a Transderm Scopolamine Patch     Dry mouth is a common side effect.  Drowsiness is another common side effect especially when combined with pain medication.  Please avoid activities that require mental alertness such as driving a car or making important legal decisions.  Since Scopolamine can cause temporary dilation of the pupils and blurred vision if it comes in contact with the eyes; be sure to wash your hands thoroughly with soap and water immediately after handling the patch.   When you remove your patch, please stick it to a tissue or paper towel for disposal.    Remove the patch immediately and contact a physician in the unlikely event that you experience symptoms of acute glaucoma (pain and reddening of the eyes, accompanied by dilated pupils).  Remove the patch if you develop any difficulties urinating.  If you cannot urinate after removing your patch, please notify your surgeon.  Remove the patch 24 hours after surgery.       Same Day Surgery Discharge Instructions for  Sedation and General Anesthesia     It's not unusual to feel dizzy, light-headed or faint for up to 24 hours after surgery or while taking pain medication.  If you have these symptoms: sit for a few minutes before standing and have someone assist you when you get up to walk or use the bathroom.    You should rest and relax for the next 24 hours. We recommend you make arrangements to have an adult stay with you for at least 24 hours after your discharge.  Avoid hazardous and strenuous activity.    DO NOT DRIVE any vehicle or operate mechanical equipment for 24 hours following the end of your surgery.  Even though you may feel normal, your reactions may be affected by the medication you have received.    Do not drink alcoholic beverages for 24 hours following surgery.     Slowly progress to your  regular diet as you feel able. It's not unusual to feel nauseated and/or vomit after receiving anesthesia.  If you develop these symptoms, drink clear liquids (apple juice, ginger ale, broth, 7-up, etc. ) until you feel better.  If your nausea and vomiting persists for 24 hours, please notify your surgeon.      All narcotic pain medications, along with inactivity and anesthesia, can cause constipation. Drinking plenty of liquids and increasing fiber intake will help.    For any questions of a medical nature, call your surgeon.    Do not make important decisions for 24 hours.    If you had general anesthesia, you may have a sore throat for a couple of days related to the breathing tube used during surgery.  You may use Cepacol lozenges to help with this discomfort.  If it worsens or if you develop a fever, contact your surgeon.     If you feel your pain is not well managed with the pain medications prescribed by your surgeon, please contact your surgeon's office to let them know so they can address your concerns.      Dr. Cuba Discharge instructions     INCISION  The incision will be covered by a dressing for the first 7-10 days after surgery. You may remove this dressing after 7-10 days. Then you may use gauze and paper tape to cover the incision to avoid irritation by clothing or brace.  Please check your incision at least once daily for signs and symptoms of infection after the week barb:  If any of the below should occur, please call the office.  -Drainage from incisional site after several days  -Opening of incisions  -Fevers greater than 101  -Flu-like symptoms  -Increased redness and/or tenderness   -If you have staples or sutures (not tape) in your incision they may be removed 2 weeks following your surgery.  This may be done by a visiting nurse, family physician or by making an appointment to come into the office.               SHOWERING  Do not shower until 1 week after surgery. At that point, you may  shower but no direct water on the surgical incision. Make sure to dry the incision and cover with dry gauze. No tub baths, hot tubs, or a whirlpool until your wound is completely healed at approximately 6-8 weeks.    EXERCISE  -Lift objects weighing less than 10-15 lbs  -Do not bend or twist at the waist-always bend your knees!!  -Limit your sitting to 20-30 minute intervals.  You should lie down or walk in between sitting periods.  There are no limitations for sitting in a recliner chair.  -Walk as much as possible-let discomfort be your guide.  You may also go up and down stairs as much as you can tolerate.  Walking outside (as long as it is nice weather) or walking on a treadmill is permitted (no incline).    PAIN  Take pain medication as prescribed.  You may use ibuprofen and/or Tylenol as needed for pain. The goal is to wean off all narcotic medication after two weeks.      BRACE:  You may have been given an abdominal binder/lumbar brace. You may use this for comfort as needed for the first 2 weeks.  DRIVING   You may NOT drive a car until completely off narcotic pain medications. You may be a passenger in a car.  If you must take a longer trip, make sure to make stops approximately every 1.5-2 hours so that you can walk around and stretch your legs to prevent blood clots.  Reclining in the passenger seat may be the most comfortable position.      FOLLOW-UP  You will have a follow-up appointment in approximately 2-3 weeks from surgery.    **If you have questions or concerns about your procedure,   call Dr. Cuba at **

## 2024-08-16 NOTE — OP NOTE
Orthopedic Surgery Operative Report    Patient:   Nicolle Salas  MRN:   [id number]   :  1990  Facility: [facility]   Date:  24        PREOPERATIVE DIAGNOSIS:    L4-L5 disc herniation with right sided lower extremity radiculopathy and weakness     POSTOPERATIVE DIAGNOSIS:    Same     PROCEDURE PERFORMED:    1. L4 inferior hemilaminotomy and L5 superior laminotomy, medial facetectomy, foraminotomy with microdiscectomy  2.Use of intraoperative microscope   3.Use of portable X-ray     SURGEON:    Chico Cuba MD     ANESTHESIA:  General     FINDINGS:    Extruded disc herniation  COMPLICATIONS:     None    ESTIMATED BLOOD LOSS:   25cc  INDICATION FOR OPERATION:  Nicolle Salas is a 33 year old female who presented to my clinic with right sided radiculopathy and weakness.  Despite non operative treatment the patient continued to have increased pain and weakness resulting in a left-sided plantar flexion weakness and falls.After reviewing the imaging studies and examination, the decision was made to proceed with the above procedure. The patient understood the risk of surgery to be infection, CSF leak, nerve root injury, failure of improvement of his symptoms. The patient voiced understanding and wanted to proceed.     DESCRIPTION OF PROCEDURE:    The patient was seen in the preoperative holding area. The patient was again given the opportunity to ask questions regarding procedural detail, risks and benefits, expected post-operative recovery. All questions were answered and informed consent was signed. The low back was marked with indelible ink at the anticipated level. The patient was then transferred back to the operative suite on a gurney. After satisfactory general anesthesia, the patient was carefully placed prone onto the Jonny frame. All bony prominences were well-padded. The back was prepped and draped in the usual sterile manner.   Prior to incision, a critical pause  was observed to identify the correct patient, correct procedure, correct level and that appropriate imaging studies were available. I also confirmed that the patient received appropriate pre-operative prophylactic antibiotics. All in the room were in agreement.  An 18 gauge spinal needle and its trochar were placed through the skin at the anticipated level and a lateral C-arm imagine was taken to verify the starting point for the skin incision. A maker was used to barb out the incision.     The skin and subcutaneous tissue was incised with 10-blade. Further dissection with electrocautery was used to reach the paraspinal fascia.  The fascia was incised on the right side of midline. Robertson and bovie used to subperiosteally dissect the paraspinal muscles off the of the spine exposing the lamina. A metallic instrument was held up to the presumed Joint of L4-L5 and another lateral C-arm image taken to confirm this was correct. A high speed jillian was used to make a permanent barb on the lamina.      Next, while using an operating microscope for visualization, a high jillian was then used to create a hemilaminotomy and to initiate a partial medial facetectomy on the right side. Kerrison rongeur was used to remove the ligamentum flavum and additional lamina. The majority of the facet joint and the pars is preserved for stability purposes. The L4 and L5 nerve was identified, the nerve/thecal sac was retracted medially with a penfield 4.A nerve root retractor was placed to hold this in place. Annulotomy is made with a 15 blade. A ball-tipped probe was used to tease out the soft disc material. The traversing nerve and common dural tube was free to mobilize at this level now.  The disk space and epidural space was carefully irrigated and probed, finding no evidence of further impinging disk disease.  Hemostasis was achieved with Floseal and bipolar cautery and the wound was copiously irrigated again. 0.25% Marcaine was injected into  the paraspinal muscles and subcutaneous tissue at the surgical site for post-operative pain relief.  The wound was then closed carefully using #1 Vicryl suture in the paraspinal fascia, 2-0 Vicryl in the deep dermal layer and 3-0 nylon for skin.  Sterile dressings are applied. All sponge and needle counts were correct in the end. The patient appeared to tolerate the procedure well and was escorted to the recovery room in satisfactory condition.

## 2024-08-16 NOTE — ANESTHESIA POSTPROCEDURE EVALUATION
Patient: Nicolle Salas    Procedure: Procedure(s):  Right Lumbar 4 to Lumbar 5 Microdiscectomy       Anesthesia Type:  General    Note:     Postop Pain Control: Uneventful            Sign Out: Well controlled pain   PONV: No   Neuro/Psych: Uneventful            Sign Out: Acceptable/Baseline neuro status   Airway/Respiratory: Uneventful            Sign Out: Acceptable/Baseline resp. status   CV/Hemodynamics: Uneventful            Sign Out: Acceptable CV status; No obvious hypovolemia; No obvious fluid overload   Other NRE: NONE   DID A NON-ROUTINE EVENT OCCUR? No           Last vitals:  Vitals Value Taken Time   /66 08/16/24 1515   Temp 36.2  C (97.1  F) 08/16/24 1439   Pulse 87 08/16/24 1523   Resp 12 08/16/24 1523   SpO2 100 % 08/16/24 1523   Vitals shown include unfiled device data.    Electronically Signed By: Nick Garner MD  August 16, 2024  3:24 PM

## 2024-08-16 NOTE — ANESTHESIA PREPROCEDURE EVALUATION
Anesthesia Pre-Procedure Evaluation    Patient: Nicolle Salas   MRN: [id number] : 1990        Procedure : Procedure(s):  Right Lumbar 4 to Lumbar 5 Microdiscectomy          Past Medical History:   Diagnosis Date    Acne     Depression     Insomnia     Lumbar radiculopathy       Past Surgical History:   Procedure Laterality Date    VASCULAR SURGERY  2020    [facility]    WISDOM TEETH EXTRACTION         Allergies   Allergen Reactions    Trazodone Dizziness, Visual Disturbance and Fatigue     Felt tunnel vision, BP dropped       Social History     Tobacco Use    Smoking status: Never    Smokeless tobacco: Never   Substance Use Topics    Alcohol use: Yes     Comment: 2 drinks per week      Wt Readings from Last 1 Encounters:   24 66.2 kg (146 lb)        Anesthesia Evaluation   Pt has had prior anesthetic. Type: MAC.    No history of anesthetic complications       ROS/MED HX  ENT/Pulmonary:  - neg pulmonary ROS     Neurologic:  - neg neurologic ROS     Cardiovascular:  - neg cardiovascular ROS     METS/Exercise Tolerance: >4 METS    Hematologic:  - neg hematologic  ROS     Musculoskeletal:       GI/Hepatic:  - neg GI/hepatic ROS     Renal/Genitourinary:  - neg Renal ROS     Endo:  - neg endo ROS     Psychiatric/Substance Use:  - neg psychiatric ROS     Infectious Disease:  - neg infectious disease ROS     Malignancy:  - neg malignancy ROS     Other:      (+)  , H/O Chronic Pain,  (-) Any chance pregnant       Physical Exam    Airway        Mallampati: I   TM distance: > 3 FB   Neck ROM: full   Mouth opening: > 3 cm    Respiratory Devices and Support         Dental       (+) Completely normal teeth      Cardiovascular   cardiovascular exam normal       Rhythm and rate: regular and normal     Pulmonary   pulmonary exam normal        breath sounds clear to auscultation           OUTSIDE LABS:  CBC:   Lab Results   Component Value Date    WBC 9.7 2024    WBC 5.6 2018    HGB  "11.3 (L) 07/28/2024    HGB 12.6 07/12/2018    HCT 37.0 07/28/2024    HCT 39.9 07/12/2018     07/28/2024     07/12/2018     BMP:   Lab Results   Component Value Date     07/28/2024     07/12/2018    POTASSIUM 3.9 07/28/2024    POTASSIUM 4.6 07/12/2018    CHLORIDE 101 07/28/2024    CHLORIDE 106 07/12/2018    CO2 28 07/28/2024    CO2 29 07/12/2018    BUN 10.0 07/28/2024    BUN 10 07/12/2018    CR 0.69 07/28/2024    CR 0.79 07/12/2018    GLC 94 07/28/2024    GLC 95 07/12/2018     COAGS: No results found for: \"PTT\", \"INR\", \"FIBR\"  POC:   Lab Results   Component Value Date    HCG Negative 08/16/2024     HEPATIC:   Lab Results   Component Value Date    ALBUMIN 4.4 07/28/2024    PROTTOTAL 6.7 07/28/2024    ALT 13 07/28/2024    AST 18 07/28/2024    ALKPHOS 57 07/28/2024    BILITOTAL 0.4 07/28/2024     OTHER:   Lab Results   Component Value Date    KEVIN 9.1 07/28/2024    TSH 0.50 10/01/2019       Anesthesia Plan    ASA Status:  1    NPO Status:  NPO Appropriate    Anesthesia Type: General.     - Airway: ETT   Induction: Propofol.   Maintenance: Balanced.        Consents    Anesthesia Plan(s) and associated risks, benefits, and realistic alternatives discussed. Questions answered and patient/representative(s) expressed understanding.     - Discussed: Risks, Benefits and Alternatives for BOTH SEDATION and the PROCEDURE were discussed     - Discussed with:  Patient      - Extended Intubation/Ventilatory Support Discussed: No.      - Patient is DNR/DNI Status: No     Use of blood products discussed: No .     Postoperative Care    Pain management: Multi-modal analgesia.   PONV prophylaxis: Ondansetron (or other 5HT-3), Dexamethasone or Solumedrol, Background Propofol Infusion, Scopolamine patch     Comments:               Azul Almeida MD    I have reviewed the pertinent notes and labs in the chart from the past 30 days and (re)examined the patient.  Any updates or changes from those notes are reflected " in this note.

## 2024-08-16 NOTE — ANESTHESIA PROCEDURE NOTES
Airway       Patient location during procedure: OR       Procedure Start/Stop Times: 8/16/2024 12:51 PM  Staff -        Anesthesiologist:  Azul Almeida MD       CRNA: Tosha Gage APRN CRNA       Performed By: CRNA  Consent for Airway        Urgency: elective  Indications and Patient Condition       Indications for airway management: gilmar-procedural       Induction type:intravenous       Mask difficulty assessment: 1 - vent by mask    Final Airway Details       Final airway type: endotracheal airway       Successful airway: ETT - single  Endotracheal Airway Details        ETT size (mm): 7.0       Successful intubation technique: direct laryngoscopy       DL Blade Type: MAC 3       Grade View of Cords: 1       Adjucts: stylet       Position: Right       Measured from: lips       Secured at (cm): 23       Bite block used: Soft    Post intubation assessment        Placement verified by: capnometry and chest rise        Number of attempts at approach: 1       Number of other approaches attempted: 0       Secured with: tape       Ease of procedure: easy       Dentition: Unchanged and Intact    Medication(s) Administered   Medication Administration Time: 8/16/2024 12:51 PM

## 2024-09-15 ENCOUNTER — HEALTH MAINTENANCE LETTER (OUTPATIENT)
Age: 34
End: 2024-09-15

## 2025-01-29 NOTE — PLAN OF CARE
Orders:    Vitamin D,25-Hydroxy; Future    Vitamin D,25-Hydroxy     Nicolle RENU Josue  July 31, 2023  Plan of Care Hand-off Note     Patient Care Path: observation    Plan for Care:   A lower level of care has been unsuccessful in treating and stabilizing patient s mental health symptoms. Patient will remain on [facility] unit under observation for continued monitoring, treatment and therapeutic intervention of mental health symptoms. Observation at [facility] could help mitigate the need for a more restrictive level of care in an inpatient setting. An LMHP will follow, working to address symptoms of depression and SI, and discussing stressors related to her marriage and how that impacts her mental health.    Identified Goals and Safety Issues:  None    Legal Status:  Vol    Updated  RN regarding plan of care.        Miley Bautista, [id number]

## (undated) DEVICE — SU STRATAFIX PDS PLUS 2-0 SPIRAL CT-1 9" 22CM SXPP1B456

## (undated) DEVICE — SPONGE COTTONOID 1/2X1/2" 80-1400

## (undated) DEVICE — DRAPE MAYO STAND 23X54 8337

## (undated) DEVICE — DRSG TEGADERM 4X10" 1627

## (undated) DEVICE — SPONGE COTTONOID 1/2X3" 80-1407

## (undated) DEVICE — PREP CHLORAPREP 26ML TINTED HI-LITE ORANGE 930815

## (undated) DEVICE — SPONGE SURGIFOAM 12 1972

## (undated) DEVICE — NDL SPINAL 18GA 3.5" 405184

## (undated) DEVICE — DRSG ADAPTIC 3X3" 6112

## (undated) DEVICE — SOL WATER IRRIG 1000ML BOTTLE 2F7114

## (undated) DEVICE — TOOL DISSECT MIDAS MR8 14CM BALL DMD 3MM DIA MR8-14BA30D

## (undated) DEVICE — SU ETHILON 3-0 FS-1 18" 669H

## (undated) DEVICE — ESU GROUND PAD UNIVERSAL W/O CORD

## (undated) DEVICE — SU VICRYL 2-0 CT-2 CR 8X18" J726D

## (undated) DEVICE — PACK UNIVERSAL SPLIT 29131

## (undated) DEVICE — LINEN TOWEL PACK X5 5464

## (undated) DEVICE — SU VICRYL 0 CT-1 CR 8X18" J740D

## (undated) DEVICE — ESU PENCIL W/HOLSTER E2350H

## (undated) DEVICE — DRAPE MICROSCOPE LEICA 54X150" AR8033650

## (undated) DEVICE — RX SURGIFLO HEMOSTATIC MATRIX W/THROMBIN 8ML 2994

## (undated) DEVICE — POSITIONER PT PRONESAFE HEAD REST W/DERMAPROX INSERT 40599

## (undated) DEVICE — BNDG ABDOMINAL BINDER 9X30-45" 79-89070

## (undated) DEVICE — PACK SPINE SM CUSTOM SNE15SSFSK

## (undated) RX ORDER — SCOLOPAMINE TRANSDERMAL SYSTEM 1 MG/1
PATCH, EXTENDED RELEASE TRANSDERMAL
Status: DISPENSED
Start: 2024-08-16

## (undated) RX ORDER — HYDROMORPHONE HYDROCHLORIDE 1 MG/ML
INJECTION, SOLUTION INTRAMUSCULAR; INTRAVENOUS; SUBCUTANEOUS
Status: DISPENSED
Start: 2024-08-16

## (undated) RX ORDER — TRIAMCINOLONE ACETONIDE 40 MG/ML
INJECTION, SUSPENSION INTRA-ARTICULAR; INTRAMUSCULAR
Status: DISPENSED
Start: 2023-05-24

## (undated) RX ORDER — VANCOMYCIN HYDROCHLORIDE 1 G/20ML
INJECTION, POWDER, LYOPHILIZED, FOR SOLUTION INTRAVENOUS
Status: DISPENSED
Start: 2024-08-16

## (undated) RX ORDER — OXYCODONE HYDROCHLORIDE 5 MG/1
TABLET ORAL
Status: DISPENSED
Start: 2024-08-16

## (undated) RX ORDER — GABAPENTIN 300 MG/1
CAPSULE ORAL
Status: DISPENSED
Start: 2024-08-16

## (undated) RX ORDER — LIDOCAINE HYDROCHLORIDE 10 MG/ML
INJECTION, SOLUTION EPIDURAL; INFILTRATION; INTRACAUDAL; PERINEURAL
Status: DISPENSED
Start: 2023-05-24

## (undated) RX ORDER — FENTANYL CITRATE 50 UG/ML
INJECTION, SOLUTION INTRAMUSCULAR; INTRAVENOUS
Status: DISPENSED
Start: 2024-08-16

## (undated) RX ORDER — BUPIVACAINE HYDROCHLORIDE AND EPINEPHRINE 5; 5 MG/ML; UG/ML
INJECTION, SOLUTION EPIDURAL; INTRACAUDAL; PERINEURAL
Status: DISPENSED
Start: 2024-08-16

## (undated) RX ORDER — CEFAZOLIN SODIUM/WATER 2 G/20 ML
SYRINGE (ML) INTRAVENOUS
Status: DISPENSED
Start: 2024-08-16

## (undated) RX ORDER — ACETAMINOPHEN 325 MG/1
TABLET ORAL
Status: DISPENSED
Start: 2024-08-16